# Patient Record
Sex: FEMALE | Race: WHITE | Employment: OTHER | URBAN - METROPOLITAN AREA
[De-identification: names, ages, dates, MRNs, and addresses within clinical notes are randomized per-mention and may not be internally consistent; named-entity substitution may affect disease eponyms.]

---

## 2020-07-07 ENCOUNTER — APPOINTMENT (OUTPATIENT)
Dept: NUCLEAR MEDICINE | Age: 85
End: 2020-07-07
Attending: EMERGENCY MEDICINE
Payer: MEDICARE

## 2020-07-07 ENCOUNTER — APPOINTMENT (OUTPATIENT)
Dept: CT IMAGING | Age: 85
End: 2020-07-07
Attending: EMERGENCY MEDICINE
Payer: MEDICARE

## 2020-07-07 ENCOUNTER — HOSPITAL ENCOUNTER (EMERGENCY)
Age: 85
Discharge: HOME | End: 2020-07-07
Attending: EMERGENCY MEDICINE | Admitting: EMERGENCY MEDICINE
Payer: MEDICARE

## 2020-07-07 ENCOUNTER — APPOINTMENT (OUTPATIENT)
Dept: GENERAL RADIOLOGY | Age: 85
End: 2020-07-07
Attending: EMERGENCY MEDICINE
Payer: MEDICARE

## 2020-07-07 VITALS
RESPIRATION RATE: 16 BRPM | HEART RATE: 63 BPM | SYSTOLIC BLOOD PRESSURE: 159 MMHG | TEMPERATURE: 98.5 F | OXYGEN SATURATION: 99 % | DIASTOLIC BLOOD PRESSURE: 53 MMHG

## 2020-07-07 DIAGNOSIS — R06.02 SOB (SHORTNESS OF BREATH): Primary | ICD-10-CM

## 2020-07-07 LAB
ALBUMIN SERPL-MCNC: 3.6 G/DL (ref 3.5–5)
ALBUMIN/GLOB SERPL: 0.9 {RATIO} (ref 1.1–2.2)
ALP SERPL-CCNC: 110 U/L (ref 45–117)
ALT SERPL-CCNC: 25 U/L (ref 12–78)
ANION GAP SERPL CALC-SCNC: 6 MMOL/L (ref 5–15)
AST SERPL-CCNC: 25 U/L (ref 15–37)
BASOPHILS # BLD: 0.1 K/UL (ref 0–0.1)
BASOPHILS NFR BLD: 0 % (ref 0–1)
BILIRUB SERPL-MCNC: 0.3 MG/DL (ref 0.2–1)
BNP SERPL-MCNC: 228 PG/ML
BUN SERPL-MCNC: 39 MG/DL (ref 6–20)
BUN/CREAT SERPL: 28 (ref 12–20)
CALCIUM SERPL-MCNC: 9.5 MG/DL (ref 8.5–10.1)
CHLORIDE SERPL-SCNC: 107 MMOL/L (ref 97–108)
CO2 SERPL-SCNC: 26 MMOL/L (ref 21–32)
COMMENT, HOLDF: NORMAL
CREAT SERPL-MCNC: 1.4 MG/DL (ref 0.55–1.02)
D DIMER PPP FEU-MCNC: 2.18 MG/L FEU (ref 0–0.65)
DIFFERENTIAL METHOD BLD: ABNORMAL
EOSINOPHIL # BLD: 0.3 K/UL (ref 0–0.4)
EOSINOPHIL NFR BLD: 2 % (ref 0–7)
ERYTHROCYTE [DISTWIDTH] IN BLOOD BY AUTOMATED COUNT: 15.2 % (ref 11.5–14.5)
GLOBULIN SER CALC-MCNC: 3.9 G/DL (ref 2–4)
GLUCOSE SERPL-MCNC: 194 MG/DL (ref 65–100)
HCT VFR BLD AUTO: 40.4 % (ref 35–47)
HGB BLD-MCNC: 12.7 G/DL (ref 11.5–16)
IMM GRANULOCYTES # BLD AUTO: 0.1 K/UL (ref 0–0.04)
IMM GRANULOCYTES NFR BLD AUTO: 1 % (ref 0–0.5)
LYMPHOCYTES # BLD: 1.7 K/UL (ref 0.8–3.5)
LYMPHOCYTES NFR BLD: 14 % (ref 12–49)
MAGNESIUM SERPL-MCNC: 2.4 MG/DL (ref 1.6–2.4)
MCH RBC QN AUTO: 29.9 PG (ref 26–34)
MCHC RBC AUTO-ENTMCNC: 31.4 G/DL (ref 30–36.5)
MCV RBC AUTO: 95.1 FL (ref 80–99)
MONOCYTES # BLD: 0.7 K/UL (ref 0–1)
MONOCYTES NFR BLD: 6 % (ref 5–13)
NEUTS SEG # BLD: 9.1 K/UL (ref 1.8–8)
NEUTS SEG NFR BLD: 77 % (ref 32–75)
NRBC # BLD: 0 K/UL (ref 0–0.01)
NRBC BLD-RTO: 0 PER 100 WBC
PLATELET # BLD AUTO: 207 K/UL (ref 150–400)
PMV BLD AUTO: 11 FL (ref 8.9–12.9)
POTASSIUM SERPL-SCNC: 4.4 MMOL/L (ref 3.5–5.1)
PROT SERPL-MCNC: 7.5 G/DL (ref 6.4–8.2)
RBC # BLD AUTO: 4.25 M/UL (ref 3.8–5.2)
SAMPLES BEING HELD,HOLD: NORMAL
SODIUM SERPL-SCNC: 139 MMOL/L (ref 136–145)
TROPONIN I SERPL-MCNC: <0.05 NG/ML
WBC # BLD AUTO: 11.9 K/UL (ref 3.6–11)

## 2020-07-07 PROCEDURE — A9558 XE133 XENON 10MCI: HCPCS

## 2020-07-07 PROCEDURE — 83880 ASSAY OF NATRIURETIC PEPTIDE: CPT

## 2020-07-07 PROCEDURE — 93005 ELECTROCARDIOGRAM TRACING: CPT

## 2020-07-07 PROCEDURE — 80053 COMPREHEN METABOLIC PANEL: CPT

## 2020-07-07 PROCEDURE — 70450 CT HEAD/BRAIN W/O DYE: CPT

## 2020-07-07 PROCEDURE — 84484 ASSAY OF TROPONIN QUANT: CPT

## 2020-07-07 PROCEDURE — 71046 X-RAY EXAM CHEST 2 VIEWS: CPT

## 2020-07-07 PROCEDURE — 85379 FIBRIN DEGRADATION QUANT: CPT

## 2020-07-07 PROCEDURE — 85025 COMPLETE CBC W/AUTO DIFF WBC: CPT

## 2020-07-07 PROCEDURE — 99285 EMERGENCY DEPT VISIT HI MDM: CPT

## 2020-07-07 PROCEDURE — 83735 ASSAY OF MAGNESIUM: CPT

## 2020-07-07 NOTE — ED PROVIDER NOTES
HPI     Pt is a 80 y.o. F with PMH of thyroid dz here with c/o shortness of breath that started today. 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 she says she was at rest when dypsnea started. She feels better now. However, during that time she felt so short of breath she couldn't speak and was concerned she was having a stroke. No fever. No sick contacts. She has had cough. No chest pain. No leg pain or swelling. No other complaints at this time.       Past Medical History:   Diagnosis Date    Arthritis     Hypertension     Hypothyroid        Past Surgical History:   Procedure Laterality Date    HX CATARACT REMOVAL      bilat    HX ORTHOPAEDIC      left shoulder arthoscopy         Family History:   Problem Relation Age of Onset    Cancer Father     Cancer Sister        Social History     Socioeconomic History    Marital status:      Spouse name: Not on file    Number of children: Not on file    Years of education: Not on file    Highest education level: Not on file   Occupational History    Not on file   Social Needs    Financial resource strain: Not on file    Food insecurity     Worry: Not on file     Inability: Not on file    Transportation needs     Medical: Not on file     Non-medical: Not on file   Tobacco Use    Smoking status: Never Smoker    Smokeless tobacco: Never Used   Substance and Sexual Activity    Alcohol use: Yes     Comment: social    Drug use: No    Sexual activity: Not on file   Lifestyle    Physical activity     Days per week: Not on file     Minutes per session: Not on file    Stress: Not on file   Relationships    Social connections     Talks on phone: Not on file     Gets together: Not on file     Attends Christianity service: Not on file     Active member of club or organization: Not on file     Attends meetings of clubs or organizations: Not on file     Relationship status: Not on file    Intimate partner violence     Fear of current or ex partner: Not on file     Emotionally abused: Not on file     Physically abused: Not on file     Forced sexual activity: Not on file   Other Topics Concern    Not on file   Social History Narrative    Not on file         ALLERGIES: Patient has no known allergies. Review of Systems   Constitutional: Negative for chills, diaphoresis and fever. HENT: Negative for congestion and trouble swallowing. Eyes: Negative for photophobia and visual disturbance. Respiratory: Positive for shortness of breath. Negative for cough and chest tightness. Cardiovascular: Negative for chest pain, palpitations and leg swelling. Gastrointestinal: Negative for abdominal pain, diarrhea, nausea and vomiting. Genitourinary: Negative for difficulty urinating, dysuria, flank pain and frequency. Musculoskeletal: Negative for back pain and myalgias. Skin: Negative for rash and wound. Neurological: Negative for dizziness, weakness, light-headedness and headaches. Hematological: Negative for adenopathy. Does not bruise/bleed easily. Psychiatric/Behavioral: Negative for agitation and confusion. All other systems reviewed and are negative. Vitals:    07/07/20 1610   BP: 141/72   Pulse: 75   Resp: 20   Temp: 97.9 °F (36.6 °C)   SpO2: 98%            Physical Exam  Vitals signs and nursing note reviewed. Constitutional:       General: She is not in acute distress. Appearance: She is well-developed. She is not diaphoretic. HENT:      Head: Normocephalic. Eyes:      Conjunctiva/sclera: Conjunctivae normal.      Pupils: Pupils are equal, round, and reactive to light. Neck:      Musculoskeletal: Normal range of motion and neck supple. Vascular: No JVD. Cardiovascular:      Rate and Rhythm: Normal rate and regular rhythm. Heart sounds: Normal heart sounds.    Pulmonary:      Effort: Pulmonary effort is normal.      Breath sounds: Rhonchi (bilateral upper lobes with experiation) present. Abdominal:      General: Bowel sounds are normal. There is no distension. Palpations: Abdomen is soft. Tenderness: There is no abdominal tenderness. Musculoskeletal: Normal range of motion. General: No tenderness or deformity. Lymphadenopathy:      Cervical: No cervical adenopathy. Skin:     General: Skin is warm and dry. Capillary Refill: Capillary refill takes less than 2 seconds. Findings: No erythema or rash. Comments: Scar right neck (old and healed)   Neurological:      Mental Status: She is alert and oriented to person, place, and time. Cranial Nerves: No cranial nerve deficit. Sensory: No sensory deficit. MDM       Procedures    ED EKG interpretation:  Rhythm: normal sinus rhythm; and regular . Rate (approx.): 73; Axis: right; P wave: normal; QRS interval: normal ; ST/T wave: normal; some artifact noted. EKG documented by Brooke Meza MD, as interpreted by Myla Ni MD, ED MD.    7:58 PM  Pt still denies complaints in ED including no dyspnea. She is waiting on V/Q scan 2/2 to shortness of breath episode earlier, right axis on EKG and elevated D-dimer with renal fxn not allowing for CTA chest to r/o PE.  CXR reviewed and normal. 2/2 to pt being 89 with episode of shortness of breath, she may benefit from COVID test prior to discharge, if able to be discharged home. 7:59 PM  Change of shift. Care of patient signed over to Dr. Angelina Causey. Bedside handoff complete. Awaiting V/Q scan . Brooke Meza MD      4362: Results of VQ scan reviewed as well as other labs and imaging. On my interview of the patient she endorses an episode of difficulty speaking which she associates with the feeling of difficulty catching her breath. During that time she also had difficulty using the telephone because of abnormal coordination. After discussion with the patient plan to add a CT head.     Head CT reassuring, discussed with patient. Discharged with instructions to follow with primary care and return for problems.

## 2020-07-07 NOTE — ED TRIAGE NOTES
Pt arrives with personal cane, in wheelchair through triage c/o sudden onset SOB this afternoon.  Denies CP, reports this has not happened before

## 2020-07-08 ENCOUNTER — PATIENT OUTREACH (OUTPATIENT)
Dept: CASE MANAGEMENT | Age: 85
End: 2020-07-08

## 2020-07-08 LAB
ATRIAL RATE: 73 BPM
CALCULATED P AXIS, ECG09: 64 DEGREES
CALCULATED R AXIS, ECG10: -23 DEGREES
CALCULATED T AXIS, ECG11: 55 DEGREES
DIAGNOSIS, 93000: NORMAL
P-R INTERVAL, ECG05: 140 MS
Q-T INTERVAL, ECG07: 388 MS
QRS DURATION, ECG06: 74 MS
QTC CALCULATION (BEZET), ECG08: 427 MS
VENTRICULAR RATE, ECG03: 73 BPM

## 2020-07-08 NOTE — ED NOTES
Assisted pt with dialing son's phone number for son to pick her up.  He will pick her up in 15-20 min

## 2020-07-08 NOTE — DISCHARGE INSTRUCTIONS

## 2020-07-08 NOTE — ED NOTES
Discharge instructions given to pt by RN in teach back method and verbalizes understanding. Opportunity for questions provided.  Pt ambulatory out of unit, in no acute distress and taken home by son

## 2020-07-08 NOTE — PROGRESS NOTES
ED 2020:  SOB  Patient contacted regarding recent discharge and COVID-19 risk. Discussed COVID-19 related testing which was not done at this time. Test results were not done. Patient informed of results, if available? no    Care Transition Nurse/ Ambulatory Care Manager contacted the family by telephone to perform post discharge assessment. Verified name and  with family as identifiers. Patient has following risk factors of: no known risk factors. CTN/ACM reviewed discharge instructions, medical action plan and red flags related to discharge diagnosis. Reviewed and educated them on any new and changed medications related to discharge diagnosis. Caretaker son reports patient c/o difficulty talking, attempting to call son Delfina Prasad, patient was using the [employer] vs the cell phone-thinks patient had a anxiety attack not being able to call him. Education provided regarding infection prevention, and signs and symptoms of COVID-19 and when to seek medical attention with family/caretaker of patient who verbalized understanding. Discussed exposure protocols and quarantine from [address] you at higher risk for severe illness  and given an opportunity for questions and concerns. The family agrees to contact the COVID-19 hotline 768-768-0006 or PCP office for questions related to their healthcare. CTN/ACM provided contact information for future reference. From CDC: Are you at higher risk for severe illness?  Wash your hands often.  Avoid close contact (6 feet, which is about two arm lengths) with people who are sick.  Put distance between yourself and other people if COVID-19 is spreading in your community.  Clean and disinfect frequently touched surfaces.  Avoid all cruise travel and non-essential air travel.  Call your healthcare professional if you have concerns about COVID-19 and your underlying condition or if you are sick.     For more information on steps you can take to protect yourself, see CDC's How to Protect Yourself      Patient/family/caregiver given information for [employer] [facility] and agrees to enroll no    Plan for follow-up call in 7-14 days based on severity of symptoms and risk factors.

## 2022-01-24 ENCOUNTER — APPOINTMENT (OUTPATIENT)
Dept: CT IMAGING | Age: 87
End: 2022-01-24
Attending: STUDENT IN AN ORGANIZED HEALTH CARE EDUCATION/TRAINING PROGRAM
Payer: MEDICARE

## 2022-01-24 ENCOUNTER — HOSPITAL ENCOUNTER (EMERGENCY)
Age: 87
Discharge: NURSING, REHAB OR LONG-TERM CARE | End: 2022-01-25
Attending: STUDENT IN AN ORGANIZED HEALTH CARE EDUCATION/TRAINING PROGRAM | Admitting: STUDENT IN AN ORGANIZED HEALTH CARE EDUCATION/TRAINING PROGRAM
Payer: MEDICARE

## 2022-01-24 DIAGNOSIS — S32.10XA CLOSED FRACTURE OF SACRUM, UNSPECIFIED PORTION OF SACRUM, INITIAL ENCOUNTER (HCC): ICD-10-CM

## 2022-01-24 DIAGNOSIS — R29.6 FALL IN ELDERLY PATIENT: Primary | ICD-10-CM

## 2022-01-24 LAB
ALBUMIN SERPL-MCNC: 3.5 G/DL (ref 3.5–5)
ALBUMIN/GLOB SERPL: 0.9 {RATIO} (ref 1.1–2.2)
ALP SERPL-CCNC: 86 U/L (ref 45–117)
ALT SERPL-CCNC: 18 U/L (ref 12–78)
ANION GAP SERPL CALC-SCNC: 5 MMOL/L (ref 5–15)
AST SERPL-CCNC: 25 U/L (ref 15–37)
ATRIAL RATE: 75 BPM
BASOPHILS # BLD: 0 K/UL (ref 0–0.1)
BASOPHILS NFR BLD: 1 % (ref 0–1)
BILIRUB SERPL-MCNC: 0.5 MG/DL (ref 0.2–1)
BUN SERPL-MCNC: 19 MG/DL (ref 6–20)
BUN/CREAT SERPL: 20 (ref 12–20)
CALCIUM SERPL-MCNC: 9.8 MG/DL (ref 8.5–10.1)
CALCULATED P AXIS, ECG09: 25 DEGREES
CALCULATED R AXIS, ECG10: -36 DEGREES
CALCULATED T AXIS, ECG11: 42 DEGREES
CHLORIDE SERPL-SCNC: 106 MMOL/L (ref 97–108)
CK SERPL-CCNC: 78 U/L (ref 26–192)
CO2 SERPL-SCNC: 28 MMOL/L (ref 21–32)
COMMENT, HOLDF: NORMAL
COVID-19 RAPID TEST, COVR: NOT DETECTED
CREAT SERPL-MCNC: 0.95 MG/DL (ref 0.55–1.02)
DIAGNOSIS, 93000: NORMAL
DIFFERENTIAL METHOD BLD: NORMAL
EOSINOPHIL # BLD: 0.4 K/UL (ref 0–0.4)
EOSINOPHIL NFR BLD: 6 % (ref 0–7)
ERYTHROCYTE [DISTWIDTH] IN BLOOD BY AUTOMATED COUNT: 13 % (ref 11.5–14.5)
GLOBULIN SER CALC-MCNC: 3.7 G/DL (ref 2–4)
GLUCOSE SERPL-MCNC: 98 MG/DL (ref 65–100)
HCT VFR BLD AUTO: 36.8 % (ref 35–47)
HGB BLD-MCNC: 11.5 G/DL (ref 11.5–16)
IMM GRANULOCYTES # BLD AUTO: 0 K/UL (ref 0–0.04)
IMM GRANULOCYTES NFR BLD AUTO: 0 % (ref 0–0.5)
LYMPHOCYTES # BLD: 1.3 K/UL (ref 0.8–3.5)
LYMPHOCYTES NFR BLD: 18 % (ref 12–49)
MCH RBC QN AUTO: 30.3 PG (ref 26–34)
MCHC RBC AUTO-ENTMCNC: 31.3 G/DL (ref 30–36.5)
MCV RBC AUTO: 96.8 FL (ref 80–99)
MONOCYTES # BLD: 0.6 K/UL (ref 0–1)
MONOCYTES NFR BLD: 8 % (ref 5–13)
NEUTS SEG # BLD: 5 K/UL (ref 1.8–8)
NEUTS SEG NFR BLD: 67 % (ref 32–75)
NRBC # BLD: 0 K/UL (ref 0–0.01)
NRBC BLD-RTO: 0 PER 100 WBC
P-R INTERVAL, ECG05: 156 MS
PLATELET # BLD AUTO: 160 K/UL (ref 150–400)
PMV BLD AUTO: 10.6 FL (ref 8.9–12.9)
POTASSIUM SERPL-SCNC: 4.1 MMOL/L (ref 3.5–5.1)
PROT SERPL-MCNC: 7.2 G/DL (ref 6.4–8.2)
Q-T INTERVAL, ECG07: 392 MS
QRS DURATION, ECG06: 76 MS
QTC CALCULATION (BEZET), ECG08: 437 MS
RBC # BLD AUTO: 3.8 M/UL (ref 3.8–5.2)
SAMPLES BEING HELD,HOLD: NORMAL
SODIUM SERPL-SCNC: 139 MMOL/L (ref 136–145)
SOURCE, COVRS: NORMAL
TROPONIN-HIGH SENSITIVITY: 23 NG/L (ref 0–51)
VENTRICULAR RATE, ECG03: 75 BPM
WBC # BLD AUTO: 7.4 K/UL (ref 3.6–11)

## 2022-01-24 PROCEDURE — 85025 COMPLETE CBC W/AUTO DIFF WBC: CPT

## 2022-01-24 PROCEDURE — 72131 CT LUMBAR SPINE W/O DYE: CPT

## 2022-01-24 PROCEDURE — 87635 SARS-COV-2 COVID-19 AMP PRB: CPT

## 2022-01-24 PROCEDURE — 99285 EMERGENCY DEPT VISIT HI MDM: CPT

## 2022-01-24 PROCEDURE — 74011250637 HC RX REV CODE- 250/637: Performed by: STUDENT IN AN ORGANIZED HEALTH CARE EDUCATION/TRAINING PROGRAM

## 2022-01-24 PROCEDURE — 82550 ASSAY OF CK (CPK): CPT

## 2022-01-24 PROCEDURE — 74011250636 HC RX REV CODE- 250/636: Performed by: STUDENT IN AN ORGANIZED HEALTH CARE EDUCATION/TRAINING PROGRAM

## 2022-01-24 PROCEDURE — 96374 THER/PROPH/DIAG INJ IV PUSH: CPT

## 2022-01-24 PROCEDURE — 72192 CT PELVIS W/O DYE: CPT

## 2022-01-24 PROCEDURE — 36415 COLL VENOUS BLD VENIPUNCTURE: CPT

## 2022-01-24 PROCEDURE — 72125 CT NECK SPINE W/O DYE: CPT

## 2022-01-24 PROCEDURE — 93005 ELECTROCARDIOGRAM TRACING: CPT

## 2022-01-24 PROCEDURE — 80053 COMPREHEN METABOLIC PANEL: CPT

## 2022-01-24 PROCEDURE — 70450 CT HEAD/BRAIN W/O DYE: CPT

## 2022-01-24 PROCEDURE — 84484 ASSAY OF TROPONIN QUANT: CPT

## 2022-01-24 RX ORDER — HYDROCODONE BITARTRATE AND ACETAMINOPHEN 5; 325 MG/1; MG/1
1 TABLET ORAL ONCE
Status: COMPLETED | OUTPATIENT
Start: 2022-01-24 | End: 2022-01-24

## 2022-01-24 RX ORDER — ASPIRIN 81 MG/1
81 TABLET ORAL 2 TIMES DAILY
Qty: 60 TABLET | Refills: 0 | Status: SHIPPED | OUTPATIENT
Start: 2022-01-24

## 2022-01-24 RX ORDER — HYDROCODONE BITARTRATE AND ACETAMINOPHEN 5; 325 MG/1; MG/1
1 TABLET ORAL
Qty: 15 TABLET | Refills: 0 | Status: SHIPPED | OUTPATIENT
Start: 2022-01-24 | End: 2022-01-24 | Stop reason: HOSPADM

## 2022-01-24 RX ORDER — ASPIRIN 81 MG/1
81 TABLET ORAL 2 TIMES DAILY
Qty: 60 TABLET | Refills: 0 | Status: SHIPPED | OUTPATIENT
Start: 2022-01-24 | End: 2022-01-24 | Stop reason: SDUPTHER

## 2022-01-24 RX ORDER — KETOROLAC TROMETHAMINE 30 MG/ML
15 INJECTION, SOLUTION INTRAMUSCULAR; INTRAVENOUS
Status: COMPLETED | OUTPATIENT
Start: 2022-01-24 | End: 2022-01-24

## 2022-01-24 RX ADMIN — KETOROLAC TROMETHAMINE 15 MG: 30 INJECTION, SOLUTION INTRAMUSCULAR; INTRAVENOUS at 11:57

## 2022-01-24 RX ADMIN — HYDROCODONE BITARTRATE AND ACETAMINOPHEN 1 TABLET: 5; 325 TABLET ORAL at 14:05

## 2022-01-24 NOTE — DISCHARGE INSTRUCTIONS
Thank you for allowing us to provide you with medical care today. We realize that you have many choices for your emergency care needs. We thank you for choosing [facility].  Please choose us in the future for any continued health care needs. We hope we addressed all of your medical concerns. We strive to provide excellent quality care in the Emergency Department. Anything less than excellent does not meet our expectations. The exam and treatment you received in the Emergency Department were for an emergent problem and are not intended as complete care. It is important that you follow up with a doctor, nurse practitioner, or  [id number] assistant for ongoing care. If your symptoms worsen or you do not improve as expected and you are unable to reach your usual health care provider, you should return to the Emergency Department. We are available 24 hours a day. Take this sheet with you when you go to your follow-up visit. If you have any problem arranging the follow-up visit, contact the Emergency Department immediately. Make an appointment your family doctor for follow up of this visit. Return to the ER if you are unable to be seen in a timely manner.

## 2022-01-24 NOTE — PROGRESS NOTES
1/24/2022 -     Date of previous inpatient admission/ ED visit? 7/7/2020 for Difficulty Breathing      What brought the patient back to ED? Fall with Vertical Sacral Fracture     Did patient decline recommended services during last admission/ ED visit (if yes, what)? Has patient seen a provider since their last inpatient admission/ED visit (if yes, when)? PCP: First and Last name: Dr. Jackelyn Villarreal   Name of Practice:    Are you a current patient: Yes/No:  Yes   Approximate date of last visit: October; next in April     CM Interventions:  From previous inpatient admission/ED visit:  From current inpatient admission/ED visit  CM notes SS CM Consult for patient living alone with recent fall, to include a vertical sacral fracture. CM met with patient, with patient alert and oriented x4. Patient is very hard of hearing and deferred assessment process to her son, present at bedside. At baseline, patient lives alone in a single story home, 4 exterior steps. At baseline, patient is independent in ADLs, to include driving and living alone. Home DME includes: cane, raised toilet seat, scale, 2x hearing aids     Patient has distant hx of HH and no hx Rehab    Pharmacy preference is: [facility] at [place]    Additional support includes: 2 adult children, whom both live locally    Patient has a Medical Directive on file that does not indicate mPOA. Per son, patient has a durable POA. Son, Zoey Thomas, is indicated as POA in the durable POA. Patient has been fully vaccinated via the [address] vaccine, with the booster dose having been received on 1/5. CM discussed IPR vs SNF Rehab. Patient's son's preference is for IPR at [facility]. CM identified that patient will likely need to work with PT and OT. Hospitalist Consult pending.   Patient's family and patient are aware that patient may not be appropriate for a hospital admission and may be held in the ED for PT/OT evals in the morning of 1/25. CM submitted referral to [facility] via [employer]. Patient cleared for diet, and nursing to switch patient to a hospital bed. CM Team to continue following for any additional EUGENIA needs. Transition of Care Plan:     The Plan for Transition of Care is related to the following treatment goals: IPR at [facility]    The Patient and/or patient representative son, Ricky Churchill, was provided with a choice of provider and agrees  with the discharge plan. Yes [x] No []    A Freedom of choice list was provided with basic dialogue that supports the patient's individualized plan of care/goals and shares the quality data associated with the providers. Yes [x] No []      CRM: Marjorie Lancaster, MPH, [facility]; Z: [phone]    18:00 -    contacted [facility]: [phone] who identified that the facility does not have bed availability tonight, 1/24. Facility is anticipated to be able to accept patient tomorrow, 1/25, and liaison will reach out to [place] Team in the morning of 1/25. Patient likely to be held in the ED overnight to work with therapies in the morning, 1/25, with anticipated admission to [facility] 1/25.   CRM: Marjorie Lancaster, MPH, [address] [place] [address]; Z: [phone]

## 2022-01-24 NOTE — PROGRESS NOTES
ED physician updated hospitalist team about likely discharge and no need of admission. Met with . IPR request initiated. At present, patient does not need admission. Hospitalist consult canceled for now. Please reconsult if needed for any other reasons.

## 2022-01-24 NOTE — ED PROVIDER NOTES
69-year-old woman with history of arthritis, hypertension, hypothyroidism who lives independently and drives and performs her ADLs on her own with pain in multiple sites after a fall. States that she was walking and slipped and fell backwards onto her bottom. She is complaining of pain in lower back and neck initially. Denies numbness, tingling, focal weakness or other neurological complaints. She did not attempt to ambulate prior to transport here. Past Medical History:   Diagnosis Date    Arthritis     Hypertension     Hypothyroid        Past Surgical History:   Procedure Laterality Date    HX CATARACT REMOVAL      bilat    HX ORTHOPAEDIC      left shoulder arthoscopy         Family History:   Problem Relation Age of Onset    Cancer Father     Cancer Sister        Social History     Socioeconomic History    Marital status:      Spouse name: Not on file    Number of children: Not on file    Years of education: Not on file    Highest education level: Not on file   Occupational History    Not on file   Tobacco Use    Smoking status: Never Smoker    Smokeless tobacco: Never Used   Substance and Sexual Activity    Alcohol use: Yes     Comment: social    Drug use: No    Sexual activity: Not on file   Other Topics Concern    Not on file   Social History Narrative    Not on file     Social Determinants of Health     Financial Resource Strain:     Difficulty of Paying Living Expenses: Not on file   Food Insecurity:     Worried About [address] in the Last Year: Not on file    Sarika of Food in the Last Year: Not on file   Transportation Needs:     Lack of Transportation (Medical): Not on file    Lack of Transportation (Non-Medical):  Not on file   Physical Activity:     Days of Exercise per Week: Not on file    Minutes of Exercise per Session: Not on file   Stress:     Feeling of Stress : Not on file   Social Connections:     Frequency of Communication with Friends and Family: Not on file    Frequency of Social Gatherings with Friends and Family: Not on file    Attends Cheondoism Services: Not on file    Active Member of Clubs or Organizations: Not on file    Attends Club or Organization Meetings: Not on file    Marital Status: Not on file   Intimate Partner Violence:     Fear of Current or Ex-Partner: Not on file    Emotionally Abused: Not on file    Physically Abused: Not on file    Sexually Abused: Not on file   Housing Stability:     Unable to Pay for Housing in the Last Year: Not on file    Number of [place] in the Last Year: Not on file    Unstable Housing in the Last Year: Not on file         ALLERGIES: Patient has no known allergies. Review of Systems   Constitutional: Positive for chills. Negative for fever. Eyes: Negative for photophobia. Respiratory: Negative for cough and shortness of breath. Cardiovascular: Positive for chest pain. Gastrointestinal: Negative for abdominal pain, nausea and vomiting. Genitourinary: Negative for dysuria and flank pain. Musculoskeletal: Negative for back pain. Skin: Negative for pallor. Neurological: Negative for light-headedness, numbness and headaches. Psychiatric/Behavioral: Negative for confusion. All other systems reviewed and are negative. Vitals:    01/24/22 0911 01/24/22 1100   BP: (!) 210/87 (!) 191/85   Pulse: 82    Resp: 20    Temp: 97 °F (36.1 °C)    SpO2: 98% 98%            Physical Exam  Vitals and nursing note reviewed. Constitutional:       General: She is not in acute distress. Appearance: She is well-developed. HENT:      Head: Normocephalic and atraumatic. Mouth/Throat:      Pharynx: No oropharyngeal exudate. Eyes:      Pupils: Pupils are equal, round, and reactive to light. Cardiovascular:      Rate and Rhythm: Normal rate. Pulmonary:      Effort: Pulmonary effort is normal.   Chest:      Chest wall: No tenderness.    Abdominal:      General: There is no distension. Palpations: Abdomen is soft. Tenderness: There is no abdominal tenderness. There is no right CVA tenderness or left CVA tenderness. Musculoskeletal:         General: No deformity. Thoracic back: No signs of trauma. Normal range of motion. Lumbar back: Bony tenderness present. No signs of trauma. Normal range of motion. Negative right straight leg raise test and negative left straight leg raise test.        Back:       Comments: Entire spine palpated, no tenderness or deformity. Skin:     General: Skin is warm and dry. Capillary Refill: Capillary refill takes less than 2 seconds. Neurological:      General: No focal deficit present. Mental Status: She is alert and oriented to person, place, and time. Cranial Nerves: Cranial nerves are intact. No cranial nerve deficit. Sensory: No sensory deficit. Motor: No weakness or pronator drift. Coordination: Coordination is intact. Psychiatric:         Behavior: Behavior normal.          MDM            No low back pain after a fall. Initially x-rays negative but patient had groin pain and a CT of the pelvis was obtained, possible vertically oriented sacral fracture. Orthopedics consulted 3:43 PM and evaluated. Awaiting attending review of films. Likely discharge if possible.     Procedures

## 2022-01-24 NOTE — CONSULTS
Orthopedic Consult Note    Date of Consultation:  January 24, 2022  Referring Physician:  Wily Hunt MD  CC: R hip pain    S: Pallavi tootie is a 80 y.o. female who c/o Right hip pain, after fall at home; Pain in the Right groin, buttocks bilaterally, moderate, worse with movement and attempting to stand; Denies focal weakness, numbness, tingling, fever, chills, abd pain, incontinence, cp, sob. Has not ambulated or urinated since event. O: Lying in bed comfortably, no resp distress; TTP lateral R hip, NTTP L hip, midline L spine, sacrum ; Pelvis stable with gentle rotational force; BLE normal alignment without obvious deformity or limb length discrepancy; skin anterior and lateral pelvic region wnl; tolerating PROM of the R and L hip; EHL/DF/PF intact, SILT in distribution of sural, superficial and deep peroneal n.; DP 2+, foot warm, cap refill <2secs.  and rectal exam deferred. Patient Vitals for the past 4 hrs:   Pulse BP   01/24/22 1522 81 139/68   01/24/22 1510 65    01/24/22 1455 67 (!) 200/98     Recent Labs     01/24/22  0931   HGB 11.5   HCT 36.8      BUN 19   CREA 0.95   K 4.1          CT PELV WO CONT  Narrative: EXAM: CT PELV WO CONT    INDICATION: r hip pain and tenderness after a fall    COMPARISON: None. CONTRAST: None. TECHNIQUE:   Multislice helical CT was performed from the iliac crest to the symphysis pubis  without intravenous contrast administration Oral contrast was/was not  administered. Coronal and sagittal reformations were generated. CT dose  reduction was achieved through use of a standardized protocol tailored for this  examination and automatic exposure control for dose modulation. FINDING:  The visualized bowel within the pelvis is normal.     Multiple sigmoid diverticula are present. . A calcified fibroid is present. There is no pelvic mass or adenopathy. There is no pelvic ascites or fluid  collection.     There is a vertically oriented left sacral fracture without involvement of  sacral ala. Impression: Probable acute vertically oriented left sacral fracture. Sigmoid diverticulosis  CT SPINE LUMB WO CONT  Narrative: INDICATION: back pain after fall    COMPARISON: None    EXAM: Axial CT imaging of the lumbar spine with coronal and sagittal  reformations. CT dose reduction was achieved through use of a standardized  protocol tailored for this examination and automatic exposure control for dose  modulation. FINDINGS: The bones are moderately osteopenic. Vertebral body heights are  normal. There is no substantial listhesis. A mild levoconvex curve is shown  centered at L2. There is diffuse disc space narrowing, appearing moderate at T11-12 and T12-L1,  and moderate at L3-4. The posterior processes and facet joints are intact. Substantial bilateral facet osteoarthrosis is present at L3-4 and L4-5. Vacuum  phenomenon at all disc levels through L4-5 is shown. There is partial ankylosis  of the L5-S1 disc space. Mild bilateral SI joint osteoarthrosis is noted. No  paraspinal soft tissue mass is demonstrated. There are moderately abundant  atherosclerotic calcifications as well as demonstration of bilateral renal  lesions which are probably cysts, measuring up to 4.5 cm. There is mild spinal canal stenosis at L2-3 with mild bilateral foraminal  stenosis. There is moderate to severe canal stenosis at L3-4, with moderate-severe left  and moderate right foraminal stenosis. There is moderate L4-5 canal stenosis with moderate to severe left and moderate  right foraminal stenosis. No canal or foraminal stenosis is shown at L5-S1. Impression: Osteopenia and degenerative spondylosis without demonstration of  fracture or dislocation. CT SPINE CERV WO CONT  Narrative: EXAM:  CT CERVICAL SPINE WITHOUT CONTRAST    INDICATION: neck pain after fall. COMPARISON: None. CONTRAST:  None.     TECHNIQUE: Multislice helical CT of the cervical spine was performed without  intravenous contrast administration. Sagittal and coronal reformats were  generated. CT dose reduction was achieved through use of a standardized  protocol tailored for this examination and automatic exposure control for dose  modulation. FINDINGS:    The bones are moderately to severely osteopenic. Vertebral body heights are  normal. Alignment is anatomic. Posterior processes and facet joints appear  intact. There is mild narrowing of the C3-4 disc space and moderate narrowing of  the C4-5, C5-6 and C6-7 disc spaces. Mild facet osteoarthrosis is shown in the  left at C3-4, C4-5 and C5-6, and on the right at C4-5. No osseous canal stenosis  is shown. There is mild right foraminal stenosis at C4-5 and C5-6. No paraspinal  soft tissue mass or collection is demonstrated. Impression: Osteopenia and spondylosis. No acute fracture or dislocation  demonstrated. CT HEAD WO CONT  Narrative: EXAM: CT HEAD WO CONT    INDICATION: headache, fall    COMPARISON: CT 7/7/2020. CONTRAST: None. TECHNIQUE: Unenhanced CT of the head was performed using 5 mm images. Brain and  bone windows were generated. Coronal and sagittal reformats. CT dose reduction  was achieved through use of a standardized protocol tailored for this  examination and automatic exposure control for dose modulation. FINDINGS:  Moderate prominence of the ventricles and cortical sulci consistent with atrophy  is again shown. Moderately abundant bilateral periventricular diminished  attenuation again noted, consistent with chronic small vessel ischemic disease  the white matter. There is no intracranial hemorrhage, extra-axial collection,  or mass effect. The basilar cisterns are open. No CT evidence of acute infarct. The bone windows demonstrate no abnormalities. The visualized portions of the  paranasal sinuses and mastoid air cells are clear.   Impression: Atrophy and chronic small vessel ischemic disease the white matter again shown. No acute findings. A/P:  No acute surgical needs; Sacral fracture, possibly chronic, no femur fracture. No neurovascular or associated visceral injury suspected. - Nonoperative management, WBAT, Pain control; DVT ppx  - New Pelvic imaging - AP/Inlet/Outlet after ambulation with therapy if not progressing   - If patient is unable to progress with therapy and/or pain is intolerable may need MRI to eval for occult fracture; follow up with Dr. Surya Downing in 2-3 weeks, call their office for appt. - Above history, exam, imaging discussed with Dr. Surya Downing and they agree with plan.     NATE Shrestha  Orthopedic Trauma Service  [facility]

## 2022-01-24 NOTE — ED NOTES
3:18 PM  Change of shift. Care of patient taken over from Dr Dinh Lakhani; H&P reviewed, bedside handoff complete. Awaiting Ortho evaluation/ disposition;     4:19 PM per Ortho 'OK to d/c home/ weight bear as tolerated; Dr Smith Cost follow-up;  Ejnikkie Yves  results have been reviewed with her. She has been counseled regarding her diagnosis. She verbally conveys understanding and agreement of the signs, symptoms, diagnosis, treatment and prognosis and additionally agrees to Call/ Arrange follow up as recommended with Dr. Cinthia Leach MD in 24 - 48 hours. She also agrees with the care-plan and conveys that all of her questions have been answered. I have also put together some discharge instructions for her that include: 1) educational information regarding their diagnosis, 2) how to care for their diagnosis at home, as well a 3) list of reasons why they would want to return to the ED prior to their follow-up appointment, should their condition change or for concerns. 4:22 PM patient's family is bedside and not comfortable taking her home Alba Johnson was alone and will not be able to function at home alone. \"  We will place case management consult. \"    Perfect Serve Consult for Admission  4:52 PM    ED Room Number: ER05/05  Patient Name and age:  Don Plaza 80 y.o.  female  Working Diagnosis:   1. Fall in elderly patient    2. Closed fracture of sacrum, unspecified portion of sacrum, initial encounter ([facility] [address]        COVID-19 Suspicion:  no  Sepsis present:  no  Reassessment needed: yes  Code Status:  Full Code  Readmission: no  Isolation Requirements:  no  Recommended Level of Care:  med/surg  Department:[facility] Adult ED - 21   Other:  Fall/ sacral fracture/ needs PT/OT evaluation/ pain control/ lives alone;     4:56 PM  Spoke with Valeria Cantu, Care management; 'I will speak with them/ see what we can work out';     5:24 PM  'adam leo a PT/OT consultation/ we need to have them evaluate her.  She needs a diet ordered also please. Im trying to get her into [facility]'; per [place];     11:27 PM  Change of shift. Care of patient signed over to Dr Josi Peres. Bedside handoff complete. Awaiting PT/OT evaluation/ PLacement.

## 2022-01-25 VITALS
OXYGEN SATURATION: 94 % | TEMPERATURE: 97.7 F | WEIGHT: 156.97 LBS | HEART RATE: 86 BPM | BODY MASS INDEX: 29.66 KG/M2 | RESPIRATION RATE: 16 BRPM | DIASTOLIC BLOOD PRESSURE: 66 MMHG | SYSTOLIC BLOOD PRESSURE: 115 MMHG

## 2022-01-25 PROCEDURE — 97535 SELF CARE MNGMENT TRAINING: CPT

## 2022-01-25 PROCEDURE — 97530 THERAPEUTIC ACTIVITIES: CPT

## 2022-01-25 PROCEDURE — 97161 PT EVAL LOW COMPLEX 20 MIN: CPT

## 2022-01-25 PROCEDURE — 97116 GAIT TRAINING THERAPY: CPT

## 2022-01-25 PROCEDURE — 97165 OT EVAL LOW COMPLEX 30 MIN: CPT

## 2022-01-25 RX ORDER — CIPROFLOXACIN 250 MG/1
TABLET, FILM COATED ORAL
COMMUNITY
Start: 2021-12-20 | End: 2022-01-25

## 2022-01-25 RX ORDER — AA/PROT/LYSINE/METHIO/VIT C/B6 50-12.5 MG
1 TABLET ORAL DAILY
COMMUNITY

## 2022-01-25 RX ORDER — NITROFURANTOIN 25; 75 MG/1; MG/1
CAPSULE ORAL
COMMUNITY
Start: 2021-04-22 | End: 2022-01-25

## 2022-01-25 RX ORDER — PHENAZOPYRIDINE HYDROCHLORIDE 100 MG/1
TABLET, FILM COATED ORAL
COMMUNITY
Start: 2021-12-10 | End: 2022-01-25

## 2022-01-25 RX ORDER — PREDNISONE 10 MG/1
TABLET ORAL
COMMUNITY
Start: 2021-06-21 | End: 2022-01-25

## 2022-01-25 RX ORDER — BETAMETHASONE DIPROPIONATE 0.5 MG/G
CREAM TOPICAL
COMMUNITY
Start: 2021-06-21 | End: 2022-01-25

## 2022-01-25 RX ORDER — MELOXICAM 7.5 MG/1
TABLET ORAL
COMMUNITY
Start: 2021-12-14 | End: 2022-01-25

## 2022-01-25 RX ORDER — SULFAMETHOXAZOLE AND TRIMETHOPRIM 800; 160 MG/1; MG/1
TABLET ORAL
COMMUNITY
Start: 2021-04-25 | End: 2022-01-25

## 2022-01-25 RX ORDER — AMOXICILLIN AND CLAVULANATE POTASSIUM 875; 125 MG/1; MG/1
TABLET, FILM COATED ORAL
COMMUNITY
Start: 2021-10-29 | End: 2022-01-25

## 2022-01-25 RX ORDER — DICLOFENAC SODIUM 10 MG/G
1 GEL TOPICAL
COMMUNITY

## 2022-01-25 RX ORDER — MELATONIN
1000 DAILY
COMMUNITY

## 2022-01-25 RX ORDER — TRIAMCINOLONE ACETONIDE 0.25 MG/ML
LOTION TOPICAL
COMMUNITY
Start: 2021-06-21 | End: 2022-01-25

## 2022-01-25 NOTE — PROGRESS NOTES
Orders received, chart reviewed and patient evaluated by occupational therapy. Pending progression with skilled acute occupational therapy, recommend:  Therapy 3 hours per day 5-7 days per week     Recommend with nursing ADLs with supervision/setup, OOB to chair 3x/day and toileting via beside commode with 2 assist and walker. Thank you for completing as able in order to maintain patient strength, endurance and independence. Full evaluation to follow.

## 2022-01-25 NOTE — PROGRESS NOTES
Orders received, chart reviewed and patient evaluated by physical therapy. Pending progression with skilled acute physical therapy, recommend:  Therapy 3 hours per day 5-7 days per week    Recommend with nursing OOB to chair 3x/day and walking daily with Min assist and walker. Thank you for completing as able in order to maintain patient strength, endurance and independence. Full evaluation to follow.

## 2022-01-25 NOTE — ED NOTES
Patient sitting in chair beside the bed talking to physical therapy and case management. Pt. Son at bedside as well. Patient has been accepted to [facility] and they can take her this afternoon. Patient eating breakfast at this time.

## 2022-01-25 NOTE — ED NOTES
Patient discharged with daughter, son will transport patient to [facility] rehab. All transfer paperwork and discharge papers given to daughter.

## 2022-01-25 NOTE — ED NOTES
Patient sitting on bedside talking with family member. Pt denies any needs at this time. Pt awaiting breakfast tray and Physical Therapy.

## 2022-01-25 NOTE — PROGRESS NOTES
Problem: Self Care Deficits Care Plan (Adult)  Goal: *Acute Goals and Plan of Care (Insert Text)  Description: FUNCTIONAL STATUS PRIOR TO ADMISSION: Patient was modified independent using a single point cane for functional mobility. Patient was driving and grocery shopping PTA. HOME SUPPORT: The patient lived alone with family in area to provide assistance. Occupational Therapy Goals  Initiated 1/25/2022  1. Patient will perform grooming sitting with supervision/set-up within 7 day(s). 2.  Patient will perform upper body dressing with supervision/set-up within 7 day(s). 3.  Patient will perform lower body dressing using most appropriate DME with minimal assistance/contact guard assist within 7 day(s). 4.  Patient will perform toilet transfers with supervision/set-up within 7 day(s). 5.  Patient will perform all aspects of toileting with minimal assistance/contact guard assist within 7 day(s). 6.  Patient will participate in upper extremity therapeutic exercise/activities with supervision/set-up for 5 minutes within 7 day(s). 7.  Patient will utilize energy conservation techniques during functional activities with verbal cues within 7 day(s). Outcome: Progressing Towards Goal    OCCUPATIONAL THERAPY EVALUATION  Patient: Anish Eden (68 y.o. female)  Date: 1/25/2022  Primary Diagnosis: No admission diagnoses are documented for this encounter. Precautions:  Fall    ASSESSMENT  Based on the objective data described below, the patient presents with limited ADL performance s/p admission for GLF with resulting L sacral fx per CT imaging. Patient ADLs limited by impaired balance, generalized weakness, decreased functional activity tolerance, and limited lower body access. Patient lives alone at baseline and uses a [facility], was driving and has family in area who assist as needed. Today, patient received on EOB, required up to min A for sit<>stand x3-4 2/2 incontinence when standing.  Patient required max A for toileting for all aspects, min A for upper body dressing and mod A for lower body dressing. Patient left sitting in chair at end of session with RN, son and CM at bedside. Patient below her mod I baseline and would benefit from short IPR stay. Will continue to follow. Current Level of Function Impacting Discharge (ADLs/self-care): up to max A for ADLs    Functional Outcome Measure: The patient scored Total: 30/100 on the Barthel Index outcome measure which is indicative of 70% impairment in basic self-care. Other factors to consider for discharge: lives alone, was mod I at baseline     Patient will benefit from skilled therapy intervention to address the above noted impairments. PLAN :  Recommendations and Planned Interventions: self care training, functional mobility training, therapeutic exercise, balance training, therapeutic activities, endurance activities, patient education, home safety training, and family training/education    Frequency/Duration: Patient will be followed by occupational therapy 5 times a week to address goals. Recommendation for discharge: (in order for the patient to meet his/her long term goals)  Therapy 3 hours per day 5-7 days per week    This discharge recommendation:  Has been made in collaboration with the attending provider and/or case management    IF patient discharges home will need the following DME: shower chair and grab bars       SUBJECTIVE:   Patient stated I feel like a baby.  re: needing to wear a brief 2/2 incontinence    OBJECTIVE DATA SUMMARY:   HISTORY:   Past Medical History:   Diagnosis Date    Arthritis     Hypertension     Hypothyroid      Past Surgical History:   Procedure Laterality Date    HX CATARACT REMOVAL      bilat    HX ORTHOPAEDIC      left shoulder arthoscopy       Expanded or extensive additional review of patient history:     Home Situation  Home Environment: Private residence  # Steps to Enter: 3  Rails to Enter: Yes  One/Two Story Residence: One story  Living Alone: Yes  Support Systems: Child(murtaza)  Current DME Used/Available at Home: Cane, straight,Other (comment) (shoe horn; sock aid)  Tub or Shower Type: Tub/Shower combination    Hand dominance: Right    EXAMINATION OF PERFORMANCE DEFICITS:  Cognitive/Behavioral Status:  Neurologic State: Alert  Orientation Level: Oriented to person;Oriented to situation;Oriented to place  Cognition: Appropriate decision making; Appropriate for age attention/concentration; Follows commands  Perception: Appears intact  Perseveration: No perseveration noted  Safety/Judgement: Awareness of environment    Skin: appears intact - noted large bruise on R elbow    Edema: none noted in BUEs    Hearing: Auditory  Auditory Impairment: Hard of hearing, bilateral    Vision/Perceptual:    Tracking: Able to track stimulus in all quadrants w/o difficulty                 Diplopia: No    Acuity: Impaired near vision; Impaired far vision    Corrective Lenses: Reading glasses    Range of Motion:  In BUEs  AROM: Generally decreased, functional                         Strength: In BUEs  Strength: Generally decreased, functional                Coordination:  Coordination: Within functional limits  Fine Motor Skills-Upper: Left Intact; Right Intact    Gross Motor Skills-Upper: Left Intact; Right Intact    Tone & Sensation:  In BUEs  Tone: Normal  Sensation: Intact                      Balance:  Sitting: Intact; Without support  Standing: Impaired; Without support  Standing - Static: Fair;Constant support  Standing - Dynamic : Fair;Constant support    Functional Mobility and Transfers for ADLs:  Bed Mobility:  Rolling:  (NT - recieved on EOB and left in chair)    Transfers:  Sit to Stand: Moderate assistance;Assist x1;Additional time; Adaptive equipment (improved to min A w/ training and cues to correct technique)  Stand to Sit: Minimum assistance;Assist x1    ADL Assessment:  Feeding: Setup    Oral Facial Hygiene/Grooming: Setup    Bathing: Moderate assistance    Upper Body Dressing: Minimum assistance    Lower Body Dressing: Moderate assistance    Toileting: Maximum assistance    ADL Intervention and task modifications:  Feeding  Drink to Mouth: Set-up    Upper Body Dressing Assistance  Front Opened Shirt: Minimum assistance (robe)  Cues: Physical assistance    Lower Body Dressing Assistance  Protective Undergarmet: Maximum assistance  Slip on Shoes with Back: Minimum assistance (uses LH shoe horn at baseline)  Leg Crossed Method Used: No  Position Performed: Seated edge of bed  Cues: Don;Physical assistance    Toileting  Toileting Assistance: Maximum assistance  Bladder Hygiene: Maximum assistance  Clothing Management: Total assistance (dependent)  Cues: Physical assistance for pants up; Tactile cues provided;Verbal cues provided    Cognitive Retraining  Safety/Judgement: Awareness of environment    Functional Measure:    Barthel Index:  Bathin  Bladder: 0  Bowels: 5  Groomin  Dressin  Feedin  Mobility: 5  Stairs: 0  Toilet Use: 5  Transfer (Bed to Chair and Back): 10  Total: 30/100      The Barthel ADL Index: Guidelines  1. The index should be used as a record of what a patient does, not as a record of what a patient could do. 2. The main aim is to establish degree of independence from any help, physical or verbal, however minor and for whatever reason. 3. The need for supervision renders the patient not independent. 4. A patient's performance should be established using the best available evidence. Asking the patient, friends/relatives and nurses are the usual sources, but direct observation and common sense are also important. However direct testing is not needed. 5. Usually the patient's performance over the preceding 24-48 hours is important, but occasionally longer periods will be relevant. 6. Middle categories imply that the patient supplies over 50 per cent of the effort.   7. Use of aids to be independent is allowed. Score Interpretation (from [address] [facility])    Independent   60-79 Minimally independent   40-59 Partially dependent   20-39 Very dependent   <20 Totally dependent     -Sunshine Browne., Barthel, D.W. (1965). Functional evaluation: the Barthel Index. [address] [place] [employer] ([address] [place] (1997). The Barthel activities of daily living index: self-reporting versus actual performance in the old (> or = 75 years). Journal of [address] [place] [address] 45(7), [address] [facility], J.JOSIASF, Lindadella Savs., [place]rendan [place]. (1999). Measuring the change in disability after inpatient rehabilitation; comparison of the responsiveness of the Barthel Index and Functional [place] Measure. Journal of Neurology, Neurosurgery, and Psychiatry, 66(4), 551-207. LEXY English, ISACC Bolden, & Radhames Abdi MIdalmisA. (2004) Assessment of post-stroke quality of life in cost-effectiveness studies: The usefulness of the Barthel Index and the EuroQoL-5D. Quality of Life Research, 15, 217-36     Occupational Therapy Evaluation Charge Determination   History Examination Decision-Making   LOW Complexity : Brief history review  MEDIUM Complexity : 3-5 performance deficits relating to physical, cognitive , or psychosocial skils that result in activity limitations and / or participation restrictions HIGH Complexity : Patient presents with comorbidities that affect occupational performance.  Signifigant modification of tasks or assistance (eg, physical or verbal) with assessment (s) is necessary to enable patient to complete evaluation       Based on the above components, the patient evaluation is determined to be of the following complexity level: MEDIUM  Pain Rating:  Reporting some pain but bearable, did not quantify    Activity Tolerance:   Good    After treatment patient left in no apparent distress:    Sitting in chair, Call bell within reach, Caregiver / family present, and RN david, DONTRELL bedside    COMMUNICATION/EDUCATION:   The patients plan of care was discussed with: Physical therapist, Registered nurse, and Case management. Home safety education was provided and the patient/caregiver indicated understanding. and Patient/family have participated as able in goal setting and plan of care. This patients plan of care is appropriate for delegation to [facility].     Thank you for this referral.  Leobardo Courtney OT  Time Calculation: 43 mins

## 2022-01-25 NOTE — PROGRESS NOTES
Problem: Mobility Impaired (Adult and Pediatric)  Goal: *Acute Goals and Plan of Care (Insert Text)  Description: FUNCTIONAL STATUS PRIOR TO ADMISSION: Patient was modified independent using a single point cane for functional mobility, drives, shopped. HOME SUPPORT PRIOR TO ADMISSION: The patient alone, did not require assist.    Physical Therapy Goals  Initiated 1/25/2022  1. Patient will move from supine to sit and sit to supine  in bed with modified independence within 5 days. 2.  Patient will perform sit to/from stand with modified independence within 5 days. 3.  Patient will ambulate 150 feet with least restrictive assistive device and modified independence within 5 days. 4.  Patient will ascend/descend 3 stairs with handrail(s) with modified independence within 5 days. Outcome: Not Met     PHYSICAL THERAPY EVALUATION  Patient: Micky Quintero (83 y.o. female)  Date: 1/25/2022  Primary Diagnosis: No admission diagnoses are documented for this encounter. Precautions:  Fall, WBAT    ASSESSMENT  Based on the objective data described below, the patient presents below her functional baseline s/p a fall in her home. Imaging shows acute vertically oriented left sacral fracture. Per ortho, no surgical intervention planned, WBAT. Currently, patient's functional mobility is limited by pain, impaired balance, decreased ROM and activity tolerance. Patient may benefit from acute therapy with transition to [facility] to progress to her PLOF before returning home where she lives alone. Current Level of Function Impacting Discharge (mobility/balance): Min A    Functional Outcome Measure: The patient scored 30/100 on the Barthel outcome measure. Other factors to consider for discharge: lives alone, below baseline     Patient will benefit from skilled therapy intervention to address the above noted impairments.        PLAN :  Recommendations and Planned Interventions: bed mobility training, transfer training, gait training, therapeutic exercises, patient and family training/education and therapeutic activities      Frequency/Duration: Patient will be followed by physical therapy:  5 times a week to address goals. Recommendation for discharge: (in order for the patient to meet his/her long term goals)  Therapy 3 hours per day 5-7 days per week    This discharge recommendation:  Has been made in collaboration with the attending provider and/or case management    IF patient discharges home will need the following DME: if home, RW and increased caregiver assistance in the home         SUBJECTIVE:   Patient stated I feel like a baby.   Referring to her incontinence and requiring assistance     OBJECTIVE DATA SUMMARY:   HISTORY:    Past Medical History:   Diagnosis Date    Arthritis     Hypertension     Hypothyroid      Past Surgical History:   Procedure Laterality Date    HX CATARACT REMOVAL      bilat    HX ORTHOPAEDIC      left shoulder arthoscopy       Personal factors and/or comorbidities impacting plan of care: HPI    Home Situation  Home Environment: Private residence  # Steps to Enter: 3  Rails to Enter: Yes  One/Two Story Residence: One story  Living Alone: Yes  Support Systems: Child(murtaza)  Current DME Used/Available at Home: Cane, straight,Other (comment) (shoe horn; sock aid)  Tub or Shower Type: Tub/Shower combination    EXAMINATION/PRESENTATION/DECISION MAKING:   Critical Behavior:  Neurologic State: Alert  Orientation Level: Oriented to person,Oriented to situation,Oriented to place  Cognition: Appropriate decision making,Appropriate for age attention/concentration,Follows commands  Safety/Judgement: Awareness of environment  Hearing:   Auditory  Auditory Impairment: Hard of hearing, bilateral  Skin:  Exposed areas grossly intact  Edema: None noted  Range Of Motion:  AROM: Generally decreased, functional                       Strength:    Strength: Generally decreased, functional Tone & Sensation:   Tone: Normal                              Coordination:  Coordination: Within functional limits  Vision:      Functional Mobility:  Bed Mobility:  N/t* - received/ remained sitting up in the chair           Transfers:  Sit to Stand: Moderate assistance;Assist x1;Additional time; Adaptive equipment (improved to min A w/ training and cues to correct technique)  Stand to Sit: Minimum assistance;Assist x1  Trained in use of walker with sit<->stand. VC required to correct hand placement. Balance:   Sitting: Intact; Without support  Standing: Impaired; Without support  Standing - Static: Fair;Constant support  Standing - Dynamic : Fair;Constant support  Ambulation/Gait Training:  Distance (ft): 50 Feet (ft)  Assistive Device: Gait belt;Walker, rolling  Ambulation - Level of Assistance: Minimal assistance;Assist x1;Additional time; Adaptive equipment     Gait Description (WDL): Exceptions to WDL  Gait Abnormalities: Decreased step clearance        Base of Support: Widened     Speed/[place]: Slow  Step Length: Right shortened;Left shortened  Instructed in walker management with ambulation. Cues to improve body position inside walker frame w/ turns and when approaching the chair to sit. Ther Activity  Pt incontinent. Worked on sit<->stand and static stand balance with the walker while OT assisted with clothing change. Instructed in safe clothing management to prevent falls. Functional Measure:  Barthel Index:    Bathin  Bladder: 0  Bowels: 5  Groomin  Dressin  Feedin  Mobility: 5  Stairs: 0  Toilet Use: 5  Transfer (Bed to Chair and Back): 10  Total: 30/100       The Barthel ADL Index: Guidelines  1. The index should be used as a record of what a patient does, not as a record of what a patient could do. 2. The main aim is to establish degree of independence from any help, physical or verbal, however minor and for whatever reason.   3. The need for supervision renders the patient not independent. 4. A patient's performance should be established using the best available evidence. Asking the patient, friends/relatives and nurses are the usual sources, but direct observation and common sense are also important. However direct testing is not needed. 5. Usually the patient's performance over the preceding 24-48 hours is important, but occasionally longer periods will be relevant. 6. Middle categories imply that the patient supplies over 50 per cent of the effort. 7. Use of aids to be independent is allowed. Score Interpretation (from [address] [address])    Independent   60-79 Minimally independent   40-59 Partially dependent   20-39 Very dependent   <20 Totally dependent     -Sunshine Browne., Barthel, DIdalmisW. (1965). Functional evaluation: the Barthel Index. [facility] ([address] [place] (1997). The Barthel activities of daily living index: self-reporting versus actual performance in the old (> or = 75 years). Journal of [address] [place] [address] 45(7), [address] [facility], JDENNYS.F, Remy Fine., [facility]. (1999). Measuring the change in disability after inpatient rehabilitation; comparison of the responsiveness of the Barthel Index and Functional [place] Measure. Journal of Neurology, Neurosurgery, and Psychiatry, 66(4), 246-013. LEXY Dowell, ISACC Bolden, & Jorden Homans, MIdalmisA. (2004) Assessment of post-stroke quality of life in cost-effectiveness studies: The usefulness of the Barthel Index and the EuroQoL-5D.  Quality of Life Research, 15, 822-93            Physical Therapy Evaluation Charge Determination   History Examination Presentation Decision-Making   LOW Complexity : Zero comorbidities / personal factors that will impact the outcome / POC LOW Complexity : 1-2 Standardized tests and measures addressing body structure, function, activity limitation and / or participation in recreation  LOW Complexity : Stable, uncomplicated  Other outcome measures Barthel 30/100  LOW       Based on the above components, the patient evaluation is determined to be of the following complexity level: LOW     Pain Rating:  Increased with mobility  Improved when sitting, stationary    Activity Tolerance:   Fair and pain    After treatment patient left in no apparent distress:   Sitting in chair, Call bell within reach, Caregiver / family present and RN and CM in the room, breakfast tray    COMMUNICATION/EDUCATION:   The patients plan of care was discussed with: Occupational therapist, Registered nurse and Case management. Fall prevention education was provided and the patient/caregiver indicated understanding., Patient/family have participated as able in goal setting and plan of care. and Patient/family agree to work toward stated goals and plan of care.     Thank you for this referral.  Kelley Omalley, PT   Time Calculation: 43 mins

## 2022-01-26 ENCOUNTER — DOCUMENTATION ONLY (OUTPATIENT)
Dept: CASE MANAGEMENT | Age: 87
End: 2022-01-26

## 2022-03-24 ENCOUNTER — HOSPITAL ENCOUNTER (OUTPATIENT)
Dept: LAB | Age: 87
Discharge: HOME | End: 2022-03-24
Payer: MEDICARE

## 2022-03-24 LAB
ALBUMIN SERPL-MCNC: 3.2 G/DL (ref 3.5–5)
ALBUMIN/GLOB SERPL: 0.9 {RATIO} (ref 1.1–2.2)
ALP SERPL-CCNC: 69 U/L (ref 45–117)
ALT SERPL-CCNC: 17 U/L (ref 12–78)
ANION GAP SERPL CALC-SCNC: 5 MMOL/L (ref 5–15)
AST SERPL W P-5'-P-CCNC: 20 U/L (ref 15–37)
BASOPHILS # BLD: 0 K/UL (ref 0–0.1)
BASOPHILS NFR BLD: 0 % (ref 0–1)
BILIRUB SERPL-MCNC: 0.4 MG/DL (ref 0.2–1)
BUN SERPL-MCNC: 18 MG/DL (ref 6–20)
BUN/CREAT SERPL: 17 (ref 12–20)
CA-I BLD-MCNC: 8.9 MG/DL (ref 8.5–10.1)
CHLORIDE SERPL-SCNC: 110 MMOL/L (ref 97–108)
CO2 SERPL-SCNC: 24 MMOL/L (ref 21–32)
CREAT SERPL-MCNC: 1.05 MG/DL (ref 0.55–1.02)
DIFFERENTIAL METHOD BLD: ABNORMAL
EOSINOPHIL # BLD: 0.6 K/UL (ref 0–0.4)
EOSINOPHIL NFR BLD: 9 % (ref 0–7)
ERYTHROCYTE [DISTWIDTH] IN BLOOD BY AUTOMATED COUNT: 15.3 % (ref 11.5–14.5)
FOLATE SERPL-MCNC: 8.8 NG/ML (ref 5–21)
GLOBULIN SER CALC-MCNC: 3.4 G/DL (ref 2–4)
GLUCOSE SERPL-MCNC: 100 MG/DL (ref 65–100)
HCT VFR BLD AUTO: 31 % (ref 35–47)
HGB BLD-MCNC: 9.6 G/DL (ref 11.5–16)
IMM GRANULOCYTES # BLD AUTO: 0 K/UL (ref 0–0.04)
IMM GRANULOCYTES NFR BLD AUTO: 0 % (ref 0–0.5)
LYMPHOCYTES # BLD: 1.7 K/UL (ref 0.8–3.5)
LYMPHOCYTES NFR BLD: 24 % (ref 12–49)
MCH RBC QN AUTO: 29.1 PG (ref 26–34)
MCHC RBC AUTO-ENTMCNC: 31 G/DL (ref 30–36.5)
MCV RBC AUTO: 93.9 FL (ref 80–99)
MONOCYTES # BLD: 0.5 K/UL (ref 0–1)
MONOCYTES NFR BLD: 8 % (ref 5–13)
NEUTS SEG # BLD: 4 K/UL (ref 1.8–8)
NEUTS SEG NFR BLD: 59 % (ref 32–75)
NRBC # BLD: 0 K/UL (ref 0–0.01)
NRBC BLD-RTO: 0 PER 100 WBC
PLATELET # BLD AUTO: 210 K/UL (ref 150–400)
PMV BLD AUTO: 10.8 FL (ref 8.9–12.9)
POTASSIUM SERPL-SCNC: 3.8 MMOL/L (ref 3.5–5.1)
PROT SERPL-MCNC: 6.6 G/DL (ref 6.4–8.2)
RBC # BLD AUTO: 3.3 M/UL (ref 3.8–5.2)
SODIUM SERPL-SCNC: 139 MMOL/L (ref 136–145)
TSH SERPL DL<=0.05 MIU/L-ACNC: 4.45 UIU/ML (ref 0.36–3.74)
VIT B12 SERPL-MCNC: 215 PG/ML (ref 193–986)
WBC # BLD AUTO: 6.9 K/UL (ref 3.6–11)

## 2022-03-24 PROCEDURE — 80053 COMPREHEN METABOLIC PANEL: CPT

## 2022-03-24 PROCEDURE — 82607 VITAMIN B-12: CPT

## 2022-03-24 PROCEDURE — 36415 COLL VENOUS BLD VENIPUNCTURE: CPT

## 2022-03-24 PROCEDURE — 84443 ASSAY THYROID STIM HORMONE: CPT

## 2022-03-24 PROCEDURE — 82746 ASSAY OF FOLIC ACID SERUM: CPT

## 2022-03-24 PROCEDURE — 85025 COMPLETE CBC W/AUTO DIFF WBC: CPT

## 2022-05-18 ENCOUNTER — APPOINTMENT (OUTPATIENT)
Dept: CT IMAGING | Age: 87
DRG: 392 | End: 2022-05-18
Attending: STUDENT IN AN ORGANIZED HEALTH CARE EDUCATION/TRAINING PROGRAM
Payer: MEDICARE

## 2022-05-18 ENCOUNTER — HOSPITAL ENCOUNTER (INPATIENT)
Age: 87
LOS: 5 days | Discharge: HOSPICE | DRG: 392 | End: 2022-05-23
Attending: STUDENT IN AN ORGANIZED HEALTH CARE EDUCATION/TRAINING PROGRAM | Admitting: INTERNAL MEDICINE
Payer: MEDICARE

## 2022-05-18 DIAGNOSIS — K57.92 DIVERTICULITIS: ICD-10-CM

## 2022-05-18 DIAGNOSIS — R10.32 ABDOMINAL PAIN, LLQ (LEFT LOWER QUADRANT): Primary | ICD-10-CM

## 2022-05-18 PROBLEM — K57.20 DIVERTICULITIS OF LARGE INTESTINE WITH PERFORATION: Status: ACTIVE | Noted: 2022-05-18

## 2022-05-18 LAB
ALBUMIN SERPL-MCNC: 3.2 G/DL (ref 3.5–5)
ALBUMIN/GLOB SERPL: 0.8 {RATIO} (ref 1.1–2.2)
ALP SERPL-CCNC: 99 U/L (ref 45–117)
ALT SERPL-CCNC: 27 U/L (ref 12–78)
ANION GAP SERPL CALC-SCNC: 5 MMOL/L (ref 5–15)
APPEARANCE UR: CLEAR
AST SERPL W P-5'-P-CCNC: 21 U/L (ref 15–37)
BACTERIA URNS QL MICRO: NEGATIVE /HPF
BASOPHILS # BLD: 0 K/UL (ref 0–0.1)
BASOPHILS NFR BLD: 0 % (ref 0–1)
BILIRUB SERPL-MCNC: 0.5 MG/DL (ref 0.2–1)
BILIRUB UR QL: NEGATIVE
BUN SERPL-MCNC: 30 MG/DL (ref 6–20)
BUN/CREAT SERPL: 31 (ref 12–20)
CA-I BLD-MCNC: 9.5 MG/DL (ref 8.5–10.1)
CHLORIDE SERPL-SCNC: 104 MMOL/L (ref 97–108)
CO2 SERPL-SCNC: 28 MMOL/L (ref 21–32)
COLOR UR: ABNORMAL
CREAT SERPL-MCNC: 0.96 MG/DL (ref 0.55–1.02)
DIFFERENTIAL METHOD BLD: ABNORMAL
EOSINOPHIL # BLD: 0.2 K/UL (ref 0–0.4)
EOSINOPHIL NFR BLD: 2 % (ref 0–7)
ERYTHROCYTE [DISTWIDTH] IN BLOOD BY AUTOMATED COUNT: 15.1 % (ref 11.5–14.5)
GLOBULIN SER CALC-MCNC: 3.8 G/DL (ref 2–4)
GLUCOSE SERPL-MCNC: 102 MG/DL (ref 65–100)
GLUCOSE UR STRIP.AUTO-MCNC: NEGATIVE MG/DL
HCT VFR BLD AUTO: 34.5 % (ref 35–47)
HGB BLD-MCNC: 11.2 G/DL (ref 11.5–16)
HGB UR QL STRIP: ABNORMAL
IMM GRANULOCYTES # BLD AUTO: 0 K/UL (ref 0–0.04)
IMM GRANULOCYTES NFR BLD AUTO: 0 % (ref 0–0.5)
KETONES UR QL STRIP.AUTO: NEGATIVE MG/DL
LEUKOCYTE ESTERASE UR QL STRIP.AUTO: NEGATIVE
LIPASE SERPL-CCNC: 145 U/L (ref 73–393)
LYMPHOCYTES # BLD: 1.4 K/UL (ref 0.8–3.5)
LYMPHOCYTES NFR BLD: 14 % (ref 12–49)
MCH RBC QN AUTO: 29.9 PG (ref 26–34)
MCHC RBC AUTO-ENTMCNC: 32.5 G/DL (ref 30–36.5)
MCV RBC AUTO: 92.2 FL (ref 80–99)
MONOCYTES # BLD: 0.8 K/UL (ref 0–1)
MONOCYTES NFR BLD: 7 % (ref 5–13)
NEUTS SEG # BLD: 7.9 K/UL (ref 1.8–8)
NEUTS SEG NFR BLD: 77 % (ref 32–75)
NITRITE UR QL STRIP.AUTO: NEGATIVE
NRBC # BLD: 0 K/UL (ref 0–0.01)
NRBC BLD-RTO: 0 PER 100 WBC
PH UR STRIP: 7 [PH] (ref 5–8)
PLATELET # BLD AUTO: 147 K/UL (ref 150–400)
PMV BLD AUTO: 11.4 FL (ref 8.9–12.9)
POTASSIUM SERPL-SCNC: 4.2 MMOL/L (ref 3.5–5.1)
PROT SERPL-MCNC: 7 G/DL (ref 6.4–8.2)
PROT UR STRIP-MCNC: NEGATIVE MG/DL
RBC # BLD AUTO: 3.74 M/UL (ref 3.8–5.2)
RBC #/AREA URNS HPF: ABNORMAL /HPF (ref 0–5)
SODIUM SERPL-SCNC: 137 MMOL/L (ref 136–145)
SP GR UR REFRACTOMETRY: 1.02 (ref 1–1.03)
UA: UC IF INDICATED,UAUC: ABNORMAL
UROBILINOGEN UR QL STRIP.AUTO: 0.1 EU/DL (ref 0.1–1)
WBC # BLD AUTO: 10.4 K/UL (ref 3.6–11)
WBC URNS QL MICRO: ABNORMAL /HPF (ref 0–4)

## 2022-05-18 PROCEDURE — 74011250636 HC RX REV CODE- 250/636: Performed by: STUDENT IN AN ORGANIZED HEALTH CARE EDUCATION/TRAINING PROGRAM

## 2022-05-18 PROCEDURE — 83690 ASSAY OF LIPASE: CPT

## 2022-05-18 PROCEDURE — 74011000258 HC RX REV CODE- 258: Performed by: INTERNAL MEDICINE

## 2022-05-18 PROCEDURE — 81001 URINALYSIS AUTO W/SCOPE: CPT

## 2022-05-18 PROCEDURE — 87040 BLOOD CULTURE FOR BACTERIA: CPT

## 2022-05-18 PROCEDURE — 80053 COMPREHEN METABOLIC PANEL: CPT

## 2022-05-18 PROCEDURE — 74011000258 HC RX REV CODE- 258: Performed by: STUDENT IN AN ORGANIZED HEALTH CARE EDUCATION/TRAINING PROGRAM

## 2022-05-18 PROCEDURE — 74011250636 HC RX REV CODE- 250/636: Performed by: HOSPITALIST

## 2022-05-18 PROCEDURE — 74177 CT ABD & PELVIS W/CONTRAST: CPT

## 2022-05-18 PROCEDURE — 74011000250 HC RX REV CODE- 250: Performed by: INTERNAL MEDICINE

## 2022-05-18 PROCEDURE — 74011000636 HC RX REV CODE- 636: Performed by: STUDENT IN AN ORGANIZED HEALTH CARE EDUCATION/TRAINING PROGRAM

## 2022-05-18 PROCEDURE — 99285 EMERGENCY DEPT VISIT HI MDM: CPT

## 2022-05-18 PROCEDURE — 74011250637 HC RX REV CODE- 250/637: Performed by: STUDENT IN AN ORGANIZED HEALTH CARE EDUCATION/TRAINING PROGRAM

## 2022-05-18 PROCEDURE — 85025 COMPLETE CBC W/AUTO DIFF WBC: CPT

## 2022-05-18 PROCEDURE — 74011250636 HC RX REV CODE- 250/636: Performed by: INTERNAL MEDICINE

## 2022-05-18 PROCEDURE — 36415 COLL VENOUS BLD VENIPUNCTURE: CPT

## 2022-05-18 PROCEDURE — 65270000029 HC RM PRIVATE

## 2022-05-18 RX ORDER — LOSARTAN POTASSIUM 100 MG/1
100 TABLET ORAL DAILY
COMMUNITY
Start: 2022-05-17

## 2022-05-18 RX ORDER — ONDANSETRON 4 MG/1
4 TABLET, FILM COATED ORAL
COMMUNITY

## 2022-05-18 RX ORDER — ONDANSETRON 4 MG/1
4 TABLET, ORALLY DISINTEGRATING ORAL
Status: DISCONTINUED | OUTPATIENT
Start: 2022-05-18 | End: 2022-05-23 | Stop reason: HOSPADM

## 2022-05-18 RX ORDER — SODIUM CHLORIDE 0.9 % (FLUSH) 0.9 %
5-40 SYRINGE (ML) INJECTION AS NEEDED
Status: DISCONTINUED | OUTPATIENT
Start: 2022-05-18 | End: 2022-05-23 | Stop reason: HOSPADM

## 2022-05-18 RX ORDER — LEVOTHYROXINE SODIUM 100 UG/1
50 TABLET ORAL
Status: DISCONTINUED | OUTPATIENT
Start: 2022-05-19 | End: 2022-05-19 | Stop reason: ALTCHOICE

## 2022-05-18 RX ORDER — ASPIRIN 81 MG/1
81 TABLET ORAL DAILY
Status: DISCONTINUED | OUTPATIENT
Start: 2022-05-19 | End: 2022-05-23 | Stop reason: HOSPADM

## 2022-05-18 RX ORDER — ACETAMINOPHEN 500 MG
1000 TABLET ORAL
COMMUNITY

## 2022-05-18 RX ORDER — ENOXAPARIN SODIUM 100 MG/ML
30 INJECTION SUBCUTANEOUS DAILY
Status: DISCONTINUED | OUTPATIENT
Start: 2022-05-19 | End: 2022-05-23 | Stop reason: HOSPADM

## 2022-05-18 RX ORDER — CHOLESTYRAMINE 4 G/4.8G
1 POWDER, FOR SUSPENSION ORAL
Status: DISCONTINUED | OUTPATIENT
Start: 2022-05-19 | End: 2022-05-23 | Stop reason: HOSPADM

## 2022-05-18 RX ORDER — AMLODIPINE BESYLATE 5 MG/1
5 TABLET ORAL DAILY
Status: DISCONTINUED | OUTPATIENT
Start: 2022-05-19 | End: 2022-05-23 | Stop reason: HOSPADM

## 2022-05-18 RX ORDER — MELATONIN
1000 DAILY
Status: DISCONTINUED | OUTPATIENT
Start: 2022-05-19 | End: 2022-05-23 | Stop reason: HOSPADM

## 2022-05-18 RX ORDER — POLYETHYLENE GLYCOL 3350 17 G/17G
17 POWDER, FOR SOLUTION ORAL
COMMUNITY

## 2022-05-18 RX ORDER — CHOLESTYRAMINE 4 G/9G
1 POWDER, FOR SUSPENSION ORAL
COMMUNITY
Start: 2022-03-18

## 2022-05-18 RX ORDER — ATORVASTATIN CALCIUM 10 MG/1
10 TABLET, FILM COATED ORAL
Status: DISCONTINUED | OUTPATIENT
Start: 2022-05-18 | End: 2022-05-23 | Stop reason: HOSPADM

## 2022-05-18 RX ORDER — FERROUS SULFATE 325(65) MG
325 TABLET, DELAYED RELEASE (ENTERIC COATED) ORAL DAILY
COMMUNITY

## 2022-05-18 RX ORDER — ACETAMINOPHEN 500 MG
1000 TABLET ORAL
Status: COMPLETED | OUTPATIENT
Start: 2022-05-18 | End: 2022-05-18

## 2022-05-18 RX ORDER — ONDANSETRON 2 MG/ML
4 INJECTION INTRAMUSCULAR; INTRAVENOUS
Status: DISCONTINUED | OUTPATIENT
Start: 2022-05-18 | End: 2022-05-23 | Stop reason: HOSPADM

## 2022-05-18 RX ORDER — FLUOCINONIDE TOPICAL SOLUTION USP, 0.05% 0.5 MG/ML
SOLUTION TOPICAL EVERY 6 HOURS
COMMUNITY

## 2022-05-18 RX ORDER — LANOLIN ALCOHOL/MO/W.PET/CERES
500 CREAM (GRAM) TOPICAL DAILY
Status: DISCONTINUED | OUTPATIENT
Start: 2022-05-19 | End: 2022-05-23 | Stop reason: HOSPADM

## 2022-05-18 RX ORDER — SODIUM CHLORIDE 9 MG/ML
150 INJECTION, SOLUTION INTRAVENOUS CONTINUOUS
Status: DISCONTINUED | OUTPATIENT
Start: 2022-05-18 | End: 2022-05-21

## 2022-05-18 RX ORDER — HYDRALAZINE HYDROCHLORIDE 20 MG/ML
10 INJECTION INTRAMUSCULAR; INTRAVENOUS
Status: DISCONTINUED | OUTPATIENT
Start: 2022-05-18 | End: 2022-05-23 | Stop reason: HOSPADM

## 2022-05-18 RX ORDER — LOSARTAN POTASSIUM 50 MG/1
100 TABLET ORAL DAILY
Status: DISCONTINUED | OUTPATIENT
Start: 2022-05-19 | End: 2022-05-23 | Stop reason: HOSPADM

## 2022-05-18 RX ORDER — ACETAMINOPHEN 650 MG/1
650 SUPPOSITORY RECTAL
Status: DISCONTINUED | OUTPATIENT
Start: 2022-05-18 | End: 2022-05-23 | Stop reason: HOSPADM

## 2022-05-18 RX ORDER — ACETAMINOPHEN 325 MG/1
650 TABLET ORAL
Status: DISCONTINUED | OUTPATIENT
Start: 2022-05-18 | End: 2022-05-23 | Stop reason: HOSPADM

## 2022-05-18 RX ORDER — AA/PROT/LYSINE/METHIO/VIT C/B6 50-12.5 MG
10 TABLET ORAL DAILY
Status: DISCONTINUED | OUTPATIENT
Start: 2022-05-19 | End: 2022-05-23 | Stop reason: HOSPADM

## 2022-05-18 RX ORDER — SODIUM CHLORIDE 0.9 % (FLUSH) 0.9 %
5-40 SYRINGE (ML) INJECTION EVERY 8 HOURS
Status: DISCONTINUED | OUTPATIENT
Start: 2022-05-18 | End: 2022-05-23 | Stop reason: HOSPADM

## 2022-05-18 RX ORDER — HYDROMORPHONE HYDROCHLORIDE 1 MG/ML
0.5 INJECTION, SOLUTION INTRAMUSCULAR; INTRAVENOUS; SUBCUTANEOUS
Status: DISPENSED | OUTPATIENT
Start: 2022-05-18 | End: 2022-05-21

## 2022-05-18 RX ORDER — LANOLIN ALCOHOL/MO/W.PET/CERES
500 CREAM (GRAM) TOPICAL DAILY
COMMUNITY
Start: 2022-05-17

## 2022-05-18 RX ORDER — TRIAMCINOLONE ACETONIDE 1 MG/G
CREAM TOPICAL
COMMUNITY

## 2022-05-18 RX ADMIN — HYDROMORPHONE HYDROCHLORIDE 0.5 MG: 1 INJECTION, SOLUTION INTRAMUSCULAR; INTRAVENOUS; SUBCUTANEOUS at 23:37

## 2022-05-18 RX ADMIN — IOPAMIDOL 100 ML: 755 INJECTION, SOLUTION INTRAVENOUS at 16:11

## 2022-05-18 RX ADMIN — PIPERACILLIN SODIUM AND TAZOBACTAM SODIUM 3.38 G: 3; .375 INJECTION, POWDER, LYOPHILIZED, FOR SOLUTION INTRAVENOUS at 23:40

## 2022-05-18 RX ADMIN — SODIUM CHLORIDE 150 ML/HR: 9 INJECTION, SOLUTION INTRAVENOUS at 23:35

## 2022-05-18 RX ADMIN — HYDRALAZINE HYDROCHLORIDE 10 MG: 20 INJECTION, SOLUTION INTRAMUSCULAR; INTRAVENOUS at 21:35

## 2022-05-18 RX ADMIN — SODIUM CHLORIDE, PRESERVATIVE FREE 10 ML: 5 INJECTION INTRAVENOUS at 21:37

## 2022-05-18 RX ADMIN — PIPERACILLIN AND TAZOBACTAM 4.5 G: 4; .5 INJECTION, POWDER, FOR SOLUTION INTRAVENOUS at 16:51

## 2022-05-18 RX ADMIN — ACETAMINOPHEN 1000 MG: 500 TABLET ORAL at 14:57

## 2022-05-18 RX ADMIN — SODIUM CHLORIDE 150 ML/HR: 9 INJECTION, SOLUTION INTRAVENOUS at 18:13

## 2022-05-18 RX ADMIN — SODIUM CHLORIDE 500 ML: 9 INJECTION, SOLUTION INTRAVENOUS at 14:57

## 2022-05-18 RX ADMIN — HYDROMORPHONE HYDROCHLORIDE 0.5 MG: 1 INJECTION, SOLUTION INTRAMUSCULAR; INTRAVENOUS; SUBCUTANEOUS at 18:39

## 2022-05-18 NOTE — ED PROVIDER NOTES
[id number]  EMERGENCY DEPARTMENT ENCOUNTER NOTE    Date: 5/18/2022  Patient Name: Rossana Saravia    History of Presenting Illness     Chief Complaint   Patient presents with    Abdominal Pain     HPI: Rossana Saravia, 80 y.o. female with a past medical history and outpatient medications as listed and reviewed below  presents for abdominal pain. She reports a 1 month history of continuous mild abdominal pain that worsened yesterday. The pain is in the left lower part and worsens with movement. She denies any chest pain or shortness of breath. .  No fevers or chills. No dysuria or hematuria. No vaginal bleeding or discharge. No lightheadedness or dizziness. No vomiting. No constipation. Medical History   I reviewed the medical, surgical, family, and social history, as well as allergies:    PCP: Frank Long MD    Past Medical History:  Past Medical History:   Diagnosis Date    Arthritis     Hypertension     Hypothyroid      Past Surgical History:  Past Surgical History:   Procedure Laterality Date    HX CATARACT REMOVAL      bilat    HX ORTHOPAEDIC      left shoulder arthoscopy     Current Outpatient Medications:  Current Outpatient Medications   Medication Instructions    amLODIPine (NORVASC) 5 mg, DAILY    aspirin delayed-release 81 mg, Oral, 2 TIMES DAILY    candesartan (ATACAND) 16 mg, DAILY    cholecalciferol (VITAMIN D3) 1,000 Units, Oral, DAILY    coenzyme q10 (Co Q-10) 10 mg cap 1 Tablet, Oral    diclofenac (VOLTAREN) 2 g, Topical, 4 TIMES DAILY, As needed for itching for back and legs.      levothyroxine (SYNTHROID) 50 mcg, DAILY BEFORE BREAKFAST    simvastatin (ZOCOR) 20 mg, EVERY BEDTIME      Family History:  Family History   Problem Relation Age of Onset    Cancer Father     Cancer Sister      Social History:  Social History     Tobacco Use    Smoking status: Never Smoker    Smokeless tobacco: Never Used   Substance Use Topics    Alcohol use: Yes     Comment: social    Drug use: No     Allergies: Allergies   Allergen Reactions    Celecoxib Unknown (comments)       Review of Systems     Review of Systems  Negative: All other systems negative. Physical Exam and Vital Signs   Vital Signs - Reviewed the patient's vital signs. Patient Vitals for the past 12 hrs:   Temp Pulse Resp BP SpO2   05/18/22 1500  83 18 (!) 169/94 98 %   05/18/22 1239 98 °F (36.7 °C) 74 18 (!) 168/77 98 %     Physical Exam:    GENERAL: awake, alert, cooperative, not in distress  HEENT:  * Pupils equal, EOMI  * Head atraumatic  CV:  * audible heart sounds  * warm and perfused extremities bilaterally  PULMONARY: Good air movement, no wheezes, no crackles  ABDOMEN/: soft, no distension, no guarding, no suprapubic tenderness, noted LLQ abdominal tenderness  EXTREMITIES/BACK: warm and perfused, no tenderness, no edema  SKIN: no rashes or signs of trauma  NEURO:  * Speech clear  * Moves U&LE to command    Medical Decision Making   - I am the first and primary provider for this patient and am the primary provider of record. - I reviewed the vital signs, available nursing notes, past medical history, past surgical history, family history and social history. - Initial assessment performed. The patients presenting problems have been discussed, and the staff are in agreement with the care plan formulated and outlined with them. I have encouraged them to ask questions as they arise throughout their visit. - Available medical records, nursing notes, old EKGs, and EMS run sheets (if patient was EMS transported) were reviewed    MDM:   Patient is a 80 y.o. female presenting for AP. Vitals reveal no significant abnormalities and physical exam reveals LLQ tenderness. Based on the history, physical exam, risk factors, and vitals signs, differential includes: Diverticulitis, colitis, enteritis, DANIELA, UTI. Will initiate workup and symptomatic treatment.  See ED Course and Reassessment for results and interpretations. Results     Labs:  Recent Results (from the past 12 hour(s))   CBC WITH AUTOMATED DIFF    Collection Time: 05/18/22  2:02 PM   Result Value Ref Range    WBC 10.4 3.6 - 11.0 K/uL    RBC 3.74 (L) 3.80 - 5.20 M/uL    HGB 11.2 (L) 11.5 - 16.0 g/dL    HCT 34.5 (L) 35.0 - 47.0 %    MCV 92.2 80.0 - 99.0 FL    MCH 29.9 26.0 - 34.0 PG    MCHC 32.5 30.0 - 36.5 g/dL    RDW 15.1 (H) 11.5 - 14.5 %    PLATELET 813 (L) 730 - 400 K/uL    MPV 11.4 8.9 - 12.9 FL    NRBC 0.0 0.0  WBC    ABSOLUTE NRBC 0.00 0.00 - 0.01 K/uL    NEUTROPHILS 77 (H) 32 - 75 %    LYMPHOCYTES 14 12 - 49 %    MONOCYTES 7 5 - 13 %    EOSINOPHILS 2 0 - 7 %    BASOPHILS 0 0 - 1 %    IMMATURE GRANULOCYTES 0 0 - 0.5 %    ABS. NEUTROPHILS 7.9 1.8 - 8.0 K/UL    ABS. LYMPHOCYTES 1.4 0.8 - 3.5 K/UL    ABS. MONOCYTES 0.8 0.0 - 1.0 K/UL    ABS. EOSINOPHILS 0.2 0.0 - 0.4 K/UL    ABS. BASOPHILS 0.0 0.0 - 0.1 K/UL    ABS. IMM. GRANS. 0.0 0.00 - 0.04 K/UL    DF AUTOMATED     METABOLIC PANEL, COMPREHENSIVE    Collection Time: 05/18/22  2:02 PM   Result Value Ref Range    Sodium 137 136 - 145 mmol/L    Potassium 4.2 3.5 - 5.1 mmol/L    Chloride 104 97 - 108 mmol/L    CO2 28 21 - 32 mmol/L    Anion gap 5 5 - 15 mmol/L    Glucose 102 (H) 65 - 100 mg/dL    BUN 30 (H) 6 - 20 mg/dL    Creatinine 0.96 0.55 - 1.02 mg/dL    BUN/Creatinine ratio 31 (H) 12 - 20      GFR est AA >60 >60 ml/min/1.73m2    GFR est non-AA 54 (L) >60 ml/min/1.73m2    Calcium 9.5 8.5 - 10.1 mg/dL    Bilirubin, total 0.5 0.2 - 1.0 mg/dL    AST (SGOT) 21 15 - 37 U/L    ALT (SGPT) 27 12 - 78 U/L    Alk.  phosphatase 99 45 - 117 U/L    Protein, total 7.0 6.4 - 8.2 g/dL    Albumin 3.2 (L) 3.5 - 5.0 g/dL    Globulin 3.8 2.0 - 4.0 g/dL    A-G Ratio 0.8 (L) 1.1 - 2.2     LIPASE    Collection Time: 05/18/22  2:02 PM   Result Value Ref Range    Lipase 145 73 - 393 U/L     Radiologic Studies:  CT Results  (Last 48 hours)    None        CXR Results  (Last 48 hours) None        Medications ordered:  Medications   sodium chloride 0.9 % bolus infusion 500 mL (500 mL IntraVENous New Bag 5/18/22 1457)   acetaminophen (TYLENOL) tablet 1,000 mg (1,000 mg Oral Given 5/18/22 1457)     ED Course and Reassessment     ED Course:     ED Course as of 05/18/22 1602   Wed May 18, 2022   1426 CBC does not show any evidence of acute process. Leukocytosis not present to suggest infection. Hemoglobin not suggestive of acute anemia. [SS]   1443 No significant electrolyte derangements. Creatinine is not elevated more than baseline range making DANIELA unlikely. No significant transaminitis noted. Normal bilirubin. Lipase is not significantly elevated. [SS]   1550 Patient received as sign out from Dr. Mahamed Caal, LLQ tenderness, will follow up UA and CT a/pelvis. [PZ]      ED Course User Index  [PZ] Brionna Garay MD  [SS] Juarez Kirk MD       Reassessment:    Was signed out to incoming provider pending CT abdomen and urinalysis. Final Disposition     Pending UA and CT. Diagnosis     Clinical Impression:   1. Abdominal pain, LLQ (left lower quadrant)      Attestations:    João Tierney MD    Please note that this dictation was completed with [employer], the computer voice recognition software. Quite often unanticipated grammatical, syntax, homophones, and other interpretive errors are inadvertently transcribed by the computer software. Please disregard these errors. Please excuse any errors that have escaped final proofreading. Thank you.

## 2022-05-18 NOTE — H&P
GENERAL GENERIC H&P/CONSULT    Subjective:    89F with past medical history for arthritis, hypertension, hypothyroidism, recent falls, lives in a womens home because of physical debility. 5/18/22 presents to hospital because of abdominal pains. Development over the previous one month. Primarily in the left lower quadrant. Crampy and intermittent. Worsens with movement. Associated with nausea. During the ED course CT imaging suggestive of sigmoid diverticulitis with microperforation. Managed with fluids and broad spectrum antibiotics and I have been asked to admit her to hospital.      Past Medical History:   Diagnosis Date    Arthritis     Hypertension     Hypothyroid       Past Surgical History:   Procedure Laterality Date    HX CATARACT REMOVAL      bilat    HX ORTHOPAEDIC      left shoulder arthoscopy      Prior to Admission medications    Medication Sig Start Date End Date Taking? Authorizing Provider   cholestyramine Shamika Farrukh) 4 gram packet  3/18/22   Provider, Historical   cyanocobalamin (VITAMIN B12) 500 mcg tablet  5/17/22   Provider, Historical   losartan (COZAAR) 100 mg tablet  5/17/22   Provider, Historical   diclofenac (VOLTAREN) 1 % gel Apply 2 g to affected area four (4) times daily. As needed for itching for back and legs. Nadia Soto MD   cholecalciferol (Vitamin D3) (1000 Units /25 mcg) tablet Take 1,000 Units by mouth daily. Nadia Soto MD   coenzyme q10 (Co Q-10) 10 mg cap Take 1 Tablet by mouth. Nadia Soto MD   aspirin delayed-release 81 mg tablet Take 1 Tablet by mouth two (2) times a day. 1/24/22   Musa Quintana MD   simvastatin (ZOCOR) 20 mg tablet Take 20 mg by mouth nightly. Nadia Soto MD   amLODIPine (NORVASC) 5 mg tablet Take 5 mg by mouth daily. Nadia Soto MD   levothyroxine (SYNTHROID) 50 mcg tablet Take 50 mcg by mouth Daily (before breakfast).       Nadia Soto MD     Allergies   Allergen Reactions    Celecoxib Unknown (comments)      Social History     Tobacco Use    Smoking status: Never Smoker    Smokeless tobacco: Never Used   Substance Use Topics    Alcohol use: Yes     Comment: social      Family History   Problem Relation Age of Onset    Cancer Father     Cancer Sister       Review of Systems   All other systems reviewed and are negative. Objective:    No intake/output data recorded. No intake/output data recorded. Patient Vitals for the past 8 hrs:   BP Temp Pulse Resp SpO2 Height Weight   05/18/22 1500 (!) 169/94  83 18 98 %     05/18/22 1239 (!) 168/77 98 °F (36.7 °C) 74 18 98 % 5' 1\" (1.549 m) 68 kg (150 lb)     Physical Exam  Vitals and nursing note reviewed. HENT:      Head: Normocephalic and atraumatic. Nose: Nose normal.      Mouth/Throat:      Mouth: Mucous membranes are dry. Eyes:      Extraocular Movements: Extraocular movements intact. Pupils: Pupils are equal, round, and reactive to light. Cardiovascular:      Rate and Rhythm: Normal rate and regular rhythm. Pulses: Normal pulses. Heart sounds: Normal heart sounds. Pulmonary:      Effort: Pulmonary effort is normal.      Breath sounds: Normal breath sounds. Abdominal:      General: Abdomen is flat. Palpations: Abdomen is soft. Tenderness: There is abdominal tenderness. There is no guarding or rebound. Comments: LLQ tenderness   Musculoskeletal:      Cervical back: Normal range of motion and neck supple. Skin:     General: Skin is dry. Neurological:      General: No focal deficit present. Mental Status: She is alert. Mental status is at baseline.    Psychiatric:         Mood and Affect: Mood normal.          Labs:    Recent Results (from the past 24 hour(s))   CBC WITH AUTOMATED DIFF    Collection Time: 05/18/22  2:02 PM   Result Value Ref Range    WBC 10.4 3.6 - 11.0 K/uL    RBC 3.74 (L) 3.80 - 5.20 M/uL    HGB 11.2 (L) 11.5 - 16.0 g/dL    HCT 34.5 (L) 35.0 - 47.0 %    MCV 92.2 80.0 - 99.0 FL    MCH 29.9 26.0 - 34.0 PG    MCHC 32.5 30.0 - 36.5 g/dL    RDW 15.1 (H) 11.5 - 14.5 %    PLATELET 568 (L) 138 - 400 K/uL    MPV 11.4 8.9 - 12.9 FL    NRBC 0.0 0.0  WBC    ABSOLUTE NRBC 0.00 0.00 - 0.01 K/uL    NEUTROPHILS 77 (H) 32 - 75 %    LYMPHOCYTES 14 12 - 49 %    MONOCYTES 7 5 - 13 %    EOSINOPHILS 2 0 - 7 %    BASOPHILS 0 0 - 1 %    IMMATURE GRANULOCYTES 0 0 - 0.5 %    ABS. NEUTROPHILS 7.9 1.8 - 8.0 K/UL    ABS. LYMPHOCYTES 1.4 0.8 - 3.5 K/UL    ABS. MONOCYTES 0.8 0.0 - 1.0 K/UL    ABS. EOSINOPHILS 0.2 0.0 - 0.4 K/UL    ABS. BASOPHILS 0.0 0.0 - 0.1 K/UL    ABS. IMM. GRANS. 0.0 0.00 - 0.04 K/UL    DF AUTOMATED     METABOLIC PANEL, COMPREHENSIVE    Collection Time: 05/18/22  2:02 PM   Result Value Ref Range    Sodium 137 136 - 145 mmol/L    Potassium 4.2 3.5 - 5.1 mmol/L    Chloride 104 97 - 108 mmol/L    CO2 28 21 - 32 mmol/L    Anion gap 5 5 - 15 mmol/L    Glucose 102 (H) 65 - 100 mg/dL    BUN 30 (H) 6 - 20 mg/dL    Creatinine 0.96 0.55 - 1.02 mg/dL    BUN/Creatinine ratio 31 (H) 12 - 20      GFR est AA >60 >60 ml/min/1.73m2    GFR est non-AA 54 (L) >60 ml/min/1.73m2    Calcium 9.5 8.5 - 10.1 mg/dL    Bilirubin, total 0.5 0.2 - 1.0 mg/dL    AST (SGOT) 21 15 - 37 U/L    ALT (SGPT) 27 12 - 78 U/L    Alk.  phosphatase 99 45 - 117 U/L    Protein, total 7.0 6.4 - 8.2 g/dL    Albumin 3.2 (L) 3.5 - 5.0 g/dL    Globulin 3.8 2.0 - 4.0 g/dL    A-G Ratio 0.8 (L) 1.1 - 2.2     LIPASE    Collection Time: 05/18/22  2:02 PM   Result Value Ref Range    Lipase 145 73 - 393 U/L   URINALYSIS W/ REFLEX CULTURE    Collection Time: 05/18/22  4:18 PM    Specimen: Urine   Result Value Ref Range    Color Yellow/Straw      Appearance Clear Clear      Specific gravity 1.016 1.003 - 1.030      pH (UA) 7.0 5.0 - 8.0      Protein Negative Negative mg/dL    Glucose Negative Negative mg/dL    Ketone Negative Negative mg/dL    Bilirubin Negative Negative      Blood Moderate (A) Negative      Urobilinogen 0.1 0.1 - 1.0 EU/dL    Nitrites Negative Negative Leukocyte Esterase Negative Negative      UA:UC IF INDICATED Culture not indicated by UA result Culture not indicated by UA result      WBC 0-4 0 - 4 /hpf    RBC 10-20 0 - 5 /hpf    Bacteria Negative Negative /hpf       ECG: ordered    CT Results  (Last 48 hours)               05/18/22 1610  CT ABD PELV W CONT Final result    Impression:  Findings most concerning for diverticulitis involving the sigmoid colon with   localized/microscopic perforation. No free air or drainable abscess. Given   patient age, this warrants follow-up with colonoscopy after acute episode. Mild dilatation of the common duct and the pancreatic duct without discrete mass   seen in the pancreatic head. Obstruction at the ampullar cannot be excluded. Recommend correlation with MRCP with and without contrast, or ERCP. COMMUNICATION:   The result was discussed with Dr. Kailyn Lindsay. Narrative:  EXAM: CT ABD PELV W CONT       INDICATION: LLQ tenderness, concern for diverticulitis       COMPARISON: CT pelvis without contrast January 24, 2022. CONTRAST: 100 mL of Isovue-370. No PO contrast       TECHNIQUE:     Multislice helical CT was performed from the diaphragm to the symphysis pubis   during uneventful rapid bolus intravenous contrast administration. Contiguous   axial images were reconstructed and lung and soft tissue windows were generated. Coronal and sagittal reformations were generated. CT dose reduction was achieved   through use of a standardized protocol tailored for this examination and   automatic exposure control for dose modulation. FINDINGS:   Lower chest: Basal atelectasis. Liver, biliary tree, and gallbladder: No focal hepatic mass. Gallbladder is   within normal limits. The common duct (13 mm) and pancreatic duct (7 mm) both mildly dilated without   discrete mass seen in the pancreatic head. There is minimal intrahepatic biliary   ductal dilatation. Spleen: Within normal limits. Pancreas: Atrophic. Kidneys and adrenals: Bilateral renal cysts. No enhancing renal mass,   obstructing calculus, or hydronephrosis. Unremarkable adrenals. Bowels: No bowel wall thickening or dilatation. Appendix is normal.       Extensive colonic diverticulosis with surrounding fat stranding (2-112), and a   gas pocket (2-106) by the sigmoid colon. No pneumoperitoneum. Peritoneum and retroperitoneum: No ascites or pneumoperitoneum. No   lymphadenopathy or aortic aneurysm. Pelvis: Bulky calcification in the uterus. Bones and abdominal wall: No destructive bone lesion. No abdominal wall mass or   hernia. Assessment:  Active Problems:    Diverticulitis of large intestine with perforation (5/18/2022)    89F with past medical history for arthritis, hypertension, hypothyroidism, recent falls, lives in a womens home because of physical debility. 5/18/22 presents to hospital because of abdominal pains. Development over the previous one month. Primarily in the left lower quadrant. Crampy and intermittent. Worsens with movement. Associated with nausea. During the ED course CT imaging suggestive of sigmoid diverticulitis with microperforation. Managed with fluids and broad spectrum antibiotics and I have been asked to admit her to hospital.    Plan:    1) Sigmoid diverticulitis with microperforation, presenting with abdominal pains slowly developing over the past one month. Admit to hospital   Supportive care   Intravenous fluids   Zosyn   Surgery on board    2) Cardiovascular issues including hypertension and dyslipidemia.      Telemetry monitoring   Continue home meds aspirin, amlodipine, losartan, simvastatin    3) DVT prophylaxis with lovenox      Signed:  Roland Avendano MD 5/18/2022

## 2022-05-18 NOTE — ED NOTES
TRANSFER - OUT REPORT:    Verbal report given to Constantino Bee (name) on [facility]  being transferred to  (unit) for routine progression of care       Report consisted of patients Situation, Background, Assessment and   Recommendations(SBAR). Information from the following report(s) SBAR and ED Summary was reviewed with the receiving nurse. Lines:   Peripheral IV 05/18/22 Anterior;Left;Proximal Forearm (Active)        Opportunity for questions and clarification was provided.       Patient transported with:   Monitor  Tech

## 2022-05-18 NOTE — ACP (ADVANCE CARE PLANNING)
Advance Care Planning   Healthcare Decision Maker:       Primary Decision Maker: Rosetta Patel [facility] - [phone]

## 2022-05-19 ENCOUNTER — APPOINTMENT (OUTPATIENT)
Dept: NON INVASIVE DIAGNOSTICS | Age: 87
DRG: 392 | End: 2022-05-19
Attending: INTERNAL MEDICINE
Payer: MEDICARE

## 2022-05-19 ENCOUNTER — APPOINTMENT (OUTPATIENT)
Dept: CT IMAGING | Age: 87
DRG: 392 | End: 2022-05-19
Attending: SURGERY
Payer: MEDICARE

## 2022-05-19 PROBLEM — E44.0 MODERATE PROTEIN-ENERGY MALNUTRITION (HCC): Status: ACTIVE | Noted: 2022-05-19

## 2022-05-19 LAB
ALBUMIN SERPL-MCNC: 3.1 G/DL (ref 3.5–5)
ALBUMIN/GLOB SERPL: 0.9 {RATIO} (ref 1.1–2.2)
ALP SERPL-CCNC: 96 U/L (ref 45–117)
ALT SERPL-CCNC: 21 U/L (ref 12–78)
ANION GAP SERPL CALC-SCNC: 8 MMOL/L (ref 5–15)
AST SERPL W P-5'-P-CCNC: 21 U/L (ref 15–37)
ATRIAL RATE: 76 BPM
BASOPHILS # BLD: 0 K/UL (ref 0–0.1)
BASOPHILS NFR BLD: 0 % (ref 0–1)
BILIRUB SERPL-MCNC: 0.6 MG/DL (ref 0.2–1)
BUN SERPL-MCNC: 20 MG/DL (ref 6–20)
BUN/CREAT SERPL: 27 (ref 12–20)
CA-I BLD-MCNC: 9.4 MG/DL (ref 8.5–10.1)
CALCULATED P AXIS, ECG09: 12 DEGREES
CALCULATED R AXIS, ECG10: -27 DEGREES
CALCULATED T AXIS, ECG11: 22 DEGREES
CHLORIDE SERPL-SCNC: 106 MMOL/L (ref 97–108)
CO2 SERPL-SCNC: 25 MMOL/L (ref 21–32)
CREAT SERPL-MCNC: 0.73 MG/DL (ref 0.55–1.02)
DIAGNOSIS, 93000: NORMAL
DIFFERENTIAL METHOD BLD: ABNORMAL
EOSINOPHIL # BLD: 0.1 K/UL (ref 0–0.4)
EOSINOPHIL NFR BLD: 1 % (ref 0–7)
ERYTHROCYTE [DISTWIDTH] IN BLOOD BY AUTOMATED COUNT: 14.8 % (ref 11.5–14.5)
GLOBULIN SER CALC-MCNC: 3.5 G/DL (ref 2–4)
GLUCOSE SERPL-MCNC: 81 MG/DL (ref 65–100)
HCT VFR BLD AUTO: 35.3 % (ref 35–47)
HGB BLD-MCNC: 11.3 G/DL (ref 11.5–16)
IMM GRANULOCYTES # BLD AUTO: 0 K/UL (ref 0–0.04)
IMM GRANULOCYTES NFR BLD AUTO: 0 % (ref 0–0.5)
LYMPHOCYTES # BLD: 1.2 K/UL (ref 0.8–3.5)
LYMPHOCYTES NFR BLD: 11 % (ref 12–49)
MAGNESIUM SERPL-MCNC: 1.9 MG/DL (ref 1.6–2.4)
MCH RBC QN AUTO: 29.5 PG (ref 26–34)
MCHC RBC AUTO-ENTMCNC: 32 G/DL (ref 30–36.5)
MCV RBC AUTO: 92.2 FL (ref 80–99)
MONOCYTES # BLD: 0.6 K/UL (ref 0–1)
MONOCYTES NFR BLD: 5 % (ref 5–13)
NEUTS SEG # BLD: 9.1 K/UL (ref 1.8–8)
NEUTS SEG NFR BLD: 83 % (ref 32–75)
NRBC # BLD: 0 K/UL (ref 0–0.01)
NRBC BLD-RTO: 0 PER 100 WBC
P-R INTERVAL, ECG05: 160 MS
PLATELET # BLD AUTO: 157 K/UL (ref 150–400)
PMV BLD AUTO: 11.8 FL (ref 8.9–12.9)
POTASSIUM SERPL-SCNC: 3.9 MMOL/L (ref 3.5–5.1)
PROT SERPL-MCNC: 6.6 G/DL (ref 6.4–8.2)
Q-T INTERVAL, ECG07: 420 MS
QRS DURATION, ECG06: 90 MS
QTC CALCULATION (BEZET), ECG08: 472 MS
RBC # BLD AUTO: 3.83 M/UL (ref 3.8–5.2)
SODIUM SERPL-SCNC: 139 MMOL/L (ref 136–145)
VENTRICULAR RATE, ECG03: 76 BPM
WBC # BLD AUTO: 11.1 K/UL (ref 3.6–11)

## 2022-05-19 PROCEDURE — 74011250637 HC RX REV CODE- 250/637: Performed by: INTERNAL MEDICINE

## 2022-05-19 PROCEDURE — 74011000636 HC RX REV CODE- 636: Performed by: HOSPITALIST

## 2022-05-19 PROCEDURE — 65270000029 HC RM PRIVATE

## 2022-05-19 PROCEDURE — 93306 TTE W/DOPPLER COMPLETE: CPT

## 2022-05-19 PROCEDURE — 93005 ELECTROCARDIOGRAM TRACING: CPT

## 2022-05-19 PROCEDURE — 74011000258 HC RX REV CODE- 258: Performed by: INTERNAL MEDICINE

## 2022-05-19 PROCEDURE — 83735 ASSAY OF MAGNESIUM: CPT

## 2022-05-19 PROCEDURE — 80053 COMPREHEN METABOLIC PANEL: CPT

## 2022-05-19 PROCEDURE — 74011000250 HC RX REV CODE- 250: Performed by: INTERNAL MEDICINE

## 2022-05-19 PROCEDURE — 36415 COLL VENOUS BLD VENIPUNCTURE: CPT

## 2022-05-19 PROCEDURE — 74177 CT ABD & PELVIS W/CONTRAST: CPT

## 2022-05-19 PROCEDURE — 85025 COMPLETE CBC W/AUTO DIFF WBC: CPT

## 2022-05-19 PROCEDURE — 74011250636 HC RX REV CODE- 250/636: Performed by: INTERNAL MEDICINE

## 2022-05-19 RX ORDER — LEVOTHYROXINE SODIUM 75 UG/1
75 TABLET ORAL
Status: DISCONTINUED | OUTPATIENT
Start: 2022-05-20 | End: 2022-05-23 | Stop reason: HOSPADM

## 2022-05-19 RX ADMIN — ASPIRIN 81 MG: 81 TABLET, COATED ORAL at 09:07

## 2022-05-19 RX ADMIN — IOPAMIDOL 100 ML: 755 INJECTION, SOLUTION INTRAVENOUS at 19:15

## 2022-05-19 RX ADMIN — LOSARTAN POTASSIUM 100 MG: 50 TABLET, FILM COATED ORAL at 09:07

## 2022-05-19 RX ADMIN — ATORVASTATIN CALCIUM 10 MG: 10 TABLET, FILM COATED ORAL at 22:23

## 2022-05-19 RX ADMIN — PIPERACILLIN SODIUM AND TAZOBACTAM SODIUM 3.38 G: 3; .375 INJECTION, POWDER, LYOPHILIZED, FOR SOLUTION INTRAVENOUS at 16:55

## 2022-05-19 RX ADMIN — CYANOCOBALAMIN TAB 500 MCG 500 MCG: 500 TAB at 09:07

## 2022-05-19 RX ADMIN — SODIUM CHLORIDE 150 ML/HR: 9 INJECTION, SOLUTION INTRAVENOUS at 11:55

## 2022-05-19 RX ADMIN — AMLODIPINE BESYLATE 5 MG: 5 TABLET ORAL at 09:07

## 2022-05-19 RX ADMIN — SODIUM CHLORIDE, PRESERVATIVE FREE 10 ML: 5 INJECTION INTRAVENOUS at 06:15

## 2022-05-19 RX ADMIN — Medication 1000 UNITS: at 09:07

## 2022-05-19 RX ADMIN — SODIUM CHLORIDE, PRESERVATIVE FREE 5 ML: 5 INJECTION INTRAVENOUS at 22:23

## 2022-05-19 RX ADMIN — DIATRIZOATE MEGLUMINE AND DIATRIZOATE SODIUM 30 ML: 660; 100 LIQUID ORAL; RECTAL at 14:45

## 2022-05-19 RX ADMIN — HYDROMORPHONE HYDROCHLORIDE 0.5 MG: 1 INJECTION, SOLUTION INTRAMUSCULAR; INTRAVENOUS; SUBCUTANEOUS at 10:01

## 2022-05-19 RX ADMIN — ONDANSETRON 4 MG: 2 INJECTION INTRAMUSCULAR; INTRAVENOUS at 10:08

## 2022-05-19 RX ADMIN — PIPERACILLIN SODIUM AND TAZOBACTAM SODIUM 3.38 G: 3; .375 INJECTION, POWDER, LYOPHILIZED, FOR SOLUTION INTRAVENOUS at 09:07

## 2022-05-19 NOTE — PROGRESS NOTES
Reason for Admission:  Abdominal pain                      RUR Score:          10%           Plan for utilizing home health:      Declined     PCP: First and Last name:  Jovany Sanchez MD     Name of Practice:    Are you a current patient: Yes/No: Yes    Approximate date of last visit: 05/04/22   Can you participate in a virtual visit with your PCP:                     Current Advanced Directive/Advance Care Plan: Full Code      Healthcare Decision Maker:   Click here to complete [place] including selection of the Healthcare Decision Maker Relationship (ie \"Primary\")             Primary Decision Maker: Rosetta Patel - Child - [phone]                  Transition of Care Plan:                      CM met with patient at bedside to complete discharge planning assessment. Patient lives at [facility], and will return there upon discharge. She ambulates with a rollator and her son drives her to appointments. PLOF: independent in ADLs. She has had home health in the past, but declined it at this time. Current discharge dispo: [employer] [facility] for [place], no needs. CM team will continue to follow.

## 2022-05-19 NOTE — PROGRESS NOTES
Comprehensive Nutrition Assessment    Type and Reason for Visit: Initial,Positive nutrition screen (MST 2)    Nutrition Recommendations/Plan:   1. NPO, advance to Low Fiber diet when medically appropriate for PO.  2. When diet starts, Ensure compact x2/d(440 kcal, 18 gm PRO). 3. Monitor while IP. Malnutrition Assessment:  Malnutrition Status: Moderate malnutrition (05/19/22 1410)    Context:  Acute illness     Findings of the 6 clinical characteristics of malnutrition:   Energy Intake:  Unable to assess  Weight Loss:  No significant weight loss     Body Fat Loss: Moderate body fat loss, Triceps,Orbital   Muscle Mass Loss:  Mild muscle mass loss, Calf  Fluid Accumulation:  No significant fluid accumulation,     Strength:  Not performed     Nutrition Assessment:    Admitted for abdominal pain x1 mth 2/2 diverticulitis w/ microperforation. NPO since admission for sx today per Pt. Reported decreased intake PTA 2/2 food preferences (cultural food preferences). UBW~150 lbs, PEL=965.5 lbs at bedside today. Consider Low Fiber diet when PO diet appropriate and add ONS. Labs: BUN 30, GFR 54, H/H 11.2/34.5. Meds: B12, D3, NS @ 150 mL/hr, zosyn, losartan, norvasc, aspirin, dilaudid. Nutrition Related Findings:    NFPE w/ moderate fat  And mild muscle loss. No N/V/D/C nor c/s issues reported per Pt, Nsg. Last BM PTA. No edema noted. Wound Type: None     Current Nutrition Intake & Therapies:  Average Meal Intake: NPO  Average Supplement Intake: NPO  DIET NPO    Anthropometric Measures:  Height: 5' 1.02\" (155 cm)  Ideal Body Weight (IBW): 105 lbs (48 kg)  Current Body Wt:  67.4 kg (148 lb 8 oz) (5/19, RD obtained.), 141.4 % IBW. Bed scale  Current BMI (kg/m2): 28  Usual Body Weight: 68 kg (150 lb)  % Weight Change (Calculated): -1  Weight Adjustment: No adjustment   BMI Category: Overweight (BMI 25.0-29. 9)    Estimated Daily Nutrient Needs:  Energy Requirements Based On: Kcal/kg  Weight Used for Energy Requirements: Current  Energy (kcal/day): 1685 kcal/d  Weight Used for Protein Requirements: Current  Protein (g/day): 75 gm/d  Method Used for Fluid Requirements: 1 ml/kcal  Fluid (ml/day): 1700 mL/d    Nutrition Diagnosis:   · Inadequate oral intake related to altered GI function,other (specify) (food preference) as evidenced by poor intake prior to admission,weight loss,moderate loss of subcutaneous fat,mild muscle loss,Criteria as identified in malnutrition assessment    Nutrition Interventions:   Food and/or Nutrient Delivery: Continue NPO  Nutrition Education/Counseling: Education not indicated  Coordination of Nutrition Care: Continue to monitor while inpatient  Plan of Care discussed with: Pt, RN. Goals:  Goals: PO intake 50% or greater,by next RD assessment    Nutrition Monitoring and Evaluation:   Behavioral-Environmental Outcomes: None identified  Food/Nutrient Intake Outcomes: Diet advancement/tolerance,Food and nutrient intake,Supplement intake  Physical Signs/Symptoms Outcomes: Biochemical data,Meal time behavior,GI status,Weight,Nutrition focused physical findings    Discharge Planning:    Continue oral nutrition supplement    Maximo Muse RD  Contact: ext. [phone] or [id number].

## 2022-05-19 NOTE — CONSULTS
[address] SURGERY CONSULT          Chief Complaint: _abdominal pain    History of Present Illness:    Ms. Marty Tapia is a 80y.o. year old * female brought in from a group home because of some lower abdominal pain for the past several days. Patient is not a good historian. No other details available. CT of the abdomen pelvis performed emergency room showed sigmoid diverticulitis with microperforation. Hence surgical consultation has been requested. No fever or chills. Past Medical History:   Past Medical History:   Diagnosis Date    Arthritis     Hypertension     Hypothyroid        Past Surgical History:   Past Surgical History:   Procedure Laterality Date    HX CATARACT REMOVAL      bilat    HX ORTHOPAEDIC      left shoulder arthoscopy        Allergy:  Allergies   Allergen Reactions    Celecoxib Unknown (comments)       Social History:  reports that she has never smoked. She has never used smokeless tobacco. She reports current alcohol use. She reports that she does not use drugs.      Family History:  Family History   Problem Relation Age of Onset    Cancer Father     Cancer Sister         Current Medications:  Current Facility-Administered Medications:     sodium chloride (NS) flush 5-40 mL, 5-40 mL, IntraVENous, Q8H, Kaylee Rahman MD, 10 mL at 05/18/22 2137    sodium chloride (NS) flush 5-40 mL, 5-40 mL, IntraVENous, PRN, Roland Rahman MD    acetaminophen (TYLENOL) tablet 650 mg, 650 mg, Oral, Q6H PRN **OR** acetaminophen (TYLENOL) suppository 650 mg, 650 mg, Rectal, Q6H PRN, Roland Rahman MD    ondansetron (ZOFRAN ODT) tablet 4 mg, 4 mg, Oral, Q8H PRN **OR** ondansetron (ZOFRAN) injection 4 mg, 4 mg, IntraVENous, Q6H PRN, Roland Rahman MD Larance Dickinson  [START ON 5/19/2022] enoxaparin (LOVENOX) injection 30 mg, 30 mg, SubCUTAneous, DAILY, Roland Rahman MD    piperacillin-tazobactam (ZOSYN) 3.375 g in 0.9% sodium chloride (MBP/ADV) 100 mL MBP, 3.375 g, IntraVENous, Q8H, Kaylee Rahman MD, Held at 05/18/22 1650    [START ON 5/19/2022] amLODIPine (NORVASC) tablet 5 mg, 5 mg, Oral, DAILY, Roland Artis MD [place]  [START ON 5/19/2022] aspirin delayed-release tablet 81 mg, 81 mg, Oral, DAILY, Roland Artis MD [facility] Konrad Langdon ON 5/19/2022] cholecalciferol (VITAMIN D3) (1000 Units /25 mcg) tablet 1,000 Units, 1,000 Units, Oral, DAILY, Frederick Artis MD [place]  . PHARMACY TO SUBSTITUTE PER PROTOCOL (Reordered from: cholestyramine Chad Rudy) 4 gram packet), , , Per Protocol, Frederick Artis MD    [Held by provider] coenzyme q10 cap 10 mg, 10 mg, Oral, DAILY, Roland Artis MD [facility] Konrad Langdon ON 5/19/2022] cyanocobalamin (VITAMIN B12) tablet 500 mcg, 500 mcg, Oral, DAILY, Roland Artis MD [place] Konrad Langdon ON 5/19/2022] levothyroxine (SYNTHROID) tablet 50 mcg, 50 mcg, Oral, ACB, Roland Artis MD [place]  [START ON 5/19/2022] losartan (COZAAR) tablet 100 mg, 100 mg, Oral, DAILY, Roland Artis MD    atorvastatin (LIPITOR) tablet 10 mg, 10 mg, Oral, QHS, Roland Artis MD    HYDROmorphone (DILAUDID) syringe 0.5 mg, 0.5 mg, IntraVENous, Q6H PRN, Igor Valencia MD, 0.5 mg at 05/18/22 1839    0.9% sodium chloride infusion, 150 mL/hr, IntraVENous, CONTINUOUS, Igor Valencia MD, Last Rate: 150 mL/hr at 05/18/22 1813, 150 mL/hr at 05/18/22 1813    hydrALAZINE (APRESOLINE) 20 mg/mL injection 10 mg, 10 mg, IntraVENous, Q4H PRN, Jolene Hernández MD, 10 mg at 05/18/22 8125     Immunization History: There is no immunization history on file for this patient. Complete    Review of Systems: Unable  to complete. Physical Exam:     Vitals & Measurements:     Wt Readings from Last 3 Encounters:   05/18/22 67.6 kg (149 lb 0.5 oz)   01/25/22 71.2 kg (156 lb 15.5 oz)   09/01/12 71.2 kg (157 lb)     Temp Readings from Last 3 Encounters:   05/18/22 98.6 °F (37 °C)   01/25/22 97.7 °F (36.5 °C)   07/07/20 98.5 °F (36.9 °C)     BP Readings from Last 3 Encounters:   05/18/22 (!) 184/88   01/25/22 115/66   07/07/20 159/53     Pulse Readings from Last 3 Encounters:   05/18/22 73   01/25/22 86   07/07/20 63      Ht Readings from Last 3 Encounters:   05/18/22 5' (1.524 m)   09/01/12 5' 1\" (1.549 m)          General: ill appearing, in distress  Head: Normal  Face: Nornal  HEENT: atraumatic, PERRLA, moist mucosa, normal pharynx, normal tonsils and adenoids, normal tongue, no fluid in sinuses  Neck: Trachea midline, no carotid bruit, no masses  Chest: Normal.  Respiratory: Normal chest wall expansion, CTA B, no r/r/w, no rubs  Cardiovascular: RRR, no m/r/g, Normal S1 and S2  Abdomen: Soft, left quadrant & suprapubic tenderness with some guarding, non-distended, normal bowel sounds in all quadrants, no hepatosplenomegaly, no tympany. Incision scar: none  Genitourinary: No inguinal hernia, normal external gentalia, no renal angle tenderness  Rectal: deferred  Musculoskeletal: normal ROM in upper and lower extremities, No joint swelling. Integumentary: Warm, dry, and pink, with no rash, purpura, or petechia  Heme/Lymph: No lymphadenopathy, no bruises  Neurological:confused  Psychiatric: confused      Laboratory Values:   Recent Results (from the past 24 hour(s))   CBC WITH AUTOMATED DIFF    Collection Time: 05/18/22  2:02 PM   Result Value Ref Range    WBC 10.4 3.6 - 11.0 K/uL    RBC 3.74 (L) 3.80 - 5.20 M/uL    HGB 11.2 (L) 11.5 - 16.0 g/dL    HCT 34.5 (L) 35.0 - 47.0 %    MCV 92.2 80.0 - 99.0 FL    MCH 29.9 26.0 - 34.0 PG    MCHC 32.5 30.0 - 36.5 g/dL    RDW 15.1 (H) 11.5 - 14.5 %    PLATELET 565 (L) 646 - 400 K/uL    MPV 11.4 8.9 - 12.9 FL    NRBC 0.0 0.0  WBC    ABSOLUTE NRBC 0.00 0.00 - 0.01 K/uL    NEUTROPHILS 77 (H) 32 - 75 %    LYMPHOCYTES 14 12 - 49 %    MONOCYTES 7 5 - 13 %    EOSINOPHILS 2 0 - 7 %    BASOPHILS 0 0 - 1 %    IMMATURE GRANULOCYTES 0 0 - 0.5 %    ABS. NEUTROPHILS 7.9 1.8 - 8.0 K/UL    ABS. LYMPHOCYTES 1.4 0.8 - 3.5 K/UL    ABS. MONOCYTES 0.8 0.0 - 1.0 K/UL    ABS. EOSINOPHILS 0.2 0.0 - 0.4 K/UL    ABS.  BASOPHILS 0.0 0.0 - 0.1 K/UL    ABS. IMM. GRANS. 0.0 0.00 - 0.04 K/UL    DF AUTOMATED     METABOLIC PANEL, COMPREHENSIVE    Collection Time: 05/18/22  2:02 PM   Result Value Ref Range    Sodium 137 136 - 145 mmol/L    Potassium 4.2 3.5 - 5.1 mmol/L    Chloride 104 97 - 108 mmol/L    CO2 28 21 - 32 mmol/L    Anion gap 5 5 - 15 mmol/L    Glucose 102 (H) 65 - 100 mg/dL    BUN 30 (H) 6 - 20 mg/dL    Creatinine 0.96 0.55 - 1.02 mg/dL    BUN/Creatinine ratio 31 (H) 12 - 20      GFR est AA >60 >60 ml/min/1.73m2    GFR est non-AA 54 (L) >60 ml/min/1.73m2    Calcium 9.5 8.5 - 10.1 mg/dL    Bilirubin, total 0.5 0.2 - 1.0 mg/dL    AST (SGOT) 21 15 - 37 U/L    ALT (SGPT) 27 12 - 78 U/L    Alk. phosphatase 99 45 - 117 U/L    Protein, total 7.0 6.4 - 8.2 g/dL    Albumin 3.2 (L) 3.5 - 5.0 g/dL    Globulin 3.8 2.0 - 4.0 g/dL    A-G Ratio 0.8 (L) 1.1 - 2.2     LIPASE    Collection Time: 05/18/22  2:02 PM   Result Value Ref Range    Lipase 145 73 - 393 U/L   URINALYSIS W/ REFLEX CULTURE    Collection Time: 05/18/22  4:18 PM    Specimen: Urine   Result Value Ref Range    Color Yellow/Straw      Appearance Clear Clear      Specific gravity 1.016 1.003 - 1.030      pH (UA) 7.0 5.0 - 8.0      Protein Negative Negative mg/dL    Glucose Negative Negative mg/dL    Ketone Negative Negative mg/dL    Bilirubin Negative Negative      Blood Moderate (A) Negative      Urobilinogen 0.1 0.1 - 1.0 EU/dL    Nitrites Negative Negative      Leukocyte Esterase Negative Negative      UA:UC IF INDICATED Culture not indicated by UA result Culture not indicated by UA result      WBC 0-4 0 - 4 /hpf    RBC 10-20 0 - 5 /hpf    Bacteria Negative Negative /hpf         CT ABD PELV W CONT   Final Result   Findings most concerning for diverticulitis involving the sigmoid colon with   localized/microscopic perforation. No free air or drainable abscess. Given   patient age, this warrants follow-up with colonoscopy after acute episode.       Mild dilatation of the common duct and the pancreatic duct without discrete mass   seen in the pancreatic head. Obstruction at the ampullar cannot be excluded. Recommend correlation with MRCP with and without contrast, or ERCP. COMMUNICATION:   The result was discussed with Dr. Xavier Adams. Assessment:  Problem List Items Addressed This Visit     None      Visit Diagnoses     Abdominal pain, LLQ (left lower quadrant)    -  Primary    Diverticulitis               Plan:    1. Admission  2. Diet: NPO  3. IV fluids  4. SCD  5. IS  6. Pain medications  7. Antibiotics: Zosyn  8. Nausea medication  9. [place]  10. Labs in am.     Thank you for the consultation & allowing me to participate in the care of this patient.

## 2022-05-19 NOTE — ROUTINE PROCESS
Two person skin assessment done by Maninder BA And Zandra Dhillon. The patient does not have any skin breakdown noted.

## 2022-05-19 NOTE — PROGRESS NOTES
Problem: Falls - Risk of  Goal: *Absence of Falls  Description: Document [place] Fall Risk and appropriate interventions in the flowsheet. Outcome: Progressing Towards Goal  Note: Fall Risk Interventions:  Mobility Interventions: Patient to call before getting OOB,Bed/chair exit alarm    Mentation Interventions: Adequate sleep, hydration, pain control,Bed/chair exit alarm,Door open when patient unattended,Evaluate medications/consider consulting pharmacy,Reorient patient,Room close to nurse's station,Toileting rounds,Update white board    Medication Interventions: Bed/chair exit alarm,Evaluate medications/consider consulting pharmacy,Patient to call before getting OOB,Teach patient to arise slowly    Elimination Interventions: Bed/chair exit alarm,Call light in reach,Patient to call for help with toileting needs,Stay With Me (per policy),Toileting schedule/hourly rounds              Problem: Pressure Injury - Risk of  Goal: *Prevention of pressure injury  Description: Document Victor Hugo Scale and appropriate interventions in the flowsheet. Outcome: Progressing Towards Goal  Note: Pressure Injury Interventions:  Sensory Interventions: Float heels,Minimize linen layers,Discuss PT/OT consult with provider,Turn and reposition approx. every two hours (pillows and wedges if needed)    Moisture Interventions: Absorbent underpads,Internal/External urinary devices,Maintain skin hydration (lotion/cream),Minimize layers    Activity Interventions: Increase time out of bed,Pressure redistribution bed/mattress(bed type)    Mobility Interventions: HOB 30 degrees or less,Pressure redistribution bed/mattress (bed type),Turn and reposition approx.  every two hours(pillow and wedges)    Nutrition Interventions: Document food/fluid/supplement intake,Offer support with meals,snacks and hydration                     Problem: General Infection Care Plan (Adult and Pediatric)  Goal: Improvement in signs and symptoms of infection  Outcome: Spoke with patient and he is requesting a call back from provider concerning Rx request.    Progressing Towards Goal  Goal: *Optimize nutritional status  Outcome: Progressing Towards Goal

## 2022-05-19 NOTE — PROGRESS NOTES
Progress Note    Patient: Danae Deleon MRN: [id number]  SSN: [id number]    YOB: 1925  Age: 80 y.o. Sex: female      Admit Date: 5/18/2022    LOS: 1 day     Subjective:     89F< with sigmoid diverticulitis with microperforation. HOSPITAL COURSE:   89F with past medical history for arthritis, hypertension, hypothyroidism, recent falls, lives in a womens home because of physical debility. 5/18/22 presents to hospital because of abdominal pains. Development over the previous one month. Primarily in the left lower quadrant. Crampy and intermittent. Worsens with movement. Associated with nausea. During the ED course CT imaging suggestive of sigmoid diverticulitis with microperforation. Managed with fluids and broad spectrum antibiotics     Review of Systems:  Constitutional:  denies weight loss, no fever, no chills, no night sweats  Eye: No recent visual disturbances, no discharge, no double vision  Ear/nose/mouth/throat : No hearing disturbance, no ear pain, no nasal congestion, no sore throat, no trouble swallowing. Respiratory : No trouble breathing, no cough, no shortness of breath, no hemoptysis, no wheezing  Cardiovascular : No chest pain, no palpitation, no racing of heart, no orthopnea, no paroxysmal nocturnal dyspnea, no peripheral edema  Gastrointestinal : ++++  nausea, +++  vomiting, no diarrhea, constipation, heartburn, ++++++ abdominal pain  Genitourinary : No dysuria, no hematuria, no increased frequency, incontinence,  Lymphatics : No swollen glands -Neck, axillary, inguinal  Endocrine : No excessive thirst, no polyuria no cold intolerance, no heat intolerance. Immunologic : No hives, urticaria, no seasonal allergies,   Musculoskeletal : Left upper back pain.   No joint swelling, pain, effusion,  no neck pain,   Integumentary : No rash, no pruritus, no ecchymosis  Hematology : No petechiae, No easy bruising,  No tendency to bleed easy  Neurology : Denies change in mental status, no abnormal balance, no headache, no confusion, numbness, tingling,  Psychiatric : No mood swings, no anxiety, depression      Objective:     Vitals:    05/19/22 0034 05/19/22 0259 05/19/22 0813 05/19/22 1021   BP: (!) 162/83 (!) 164/86 (!) 166/78 (!) 166/78   Pulse: 92 95 84    Resp: 20 20 16    Temp: 98.1 °F (36.7 °C) 98.6 °F (37 °C) 98.6 °F (37 °C)    SpO2: 98% 96% 97%    Weight:    68 kg (149 lb 14.6 oz)   Height:    5' 1.02\" (1.55 m)        Intake and Output:  Current Shift: No intake/output data recorded. Last three shifts: 05/17 1901 - 05/19 0700  In: -   Out: 900 [Urine:900]      Physical Exam:      Physical Exam  Vitals and nursing note reviewed. HENT:      Head: Normocephalic and atraumatic. Nose: Nose normal.      Mouth/Throat:      Mouth: Mucous membranes are dry. Eyes:      Extraocular Movements: Extraocular movements intact. Pupils: Pupils are equal, round, and reactive to light. Cardiovascular:      Rate and Rhythm: Normal rate and regular rhythm. Pulses: Normal pulses. Heart sounds: Normal heart sounds. Pulmonary:      Effort: Pulmonary effort is normal.      Breath sounds: Normal breath sounds. Abdominal:      General: Abdomen is flat. Palpations: Abdomen is soft. Tenderness: There is abdominal tenderness. There is no guarding or rebound. Comments: LLQ tenderness   Musculoskeletal:      Cervical back: Normal range of motion and neck supple. Skin:     General: Skin is dry. Neurological:      General: No focal deficit present. Mental Status: She is alert. Mental status is at baseline.    Psychiatric:         Mood and Affect: Mood normal.          Lab/Data Review:  Recent Results (from the past 24 hour(s))   CBC WITH AUTOMATED DIFF    Collection Time: 05/18/22  2:02 PM   Result Value Ref Range    WBC 10.4 3.6 - 11.0 K/uL    RBC 3.74 (L) 3.80 - 5.20 M/uL    HGB 11.2 (L) 11.5 - 16.0 g/dL    HCT 34.5 (L) 35.0 - 47.0 %    MCV 92.2 80.0 - 99.0 FL    MCH 29.9 26.0 - 34.0 PG    MCHC 32.5 30.0 - 36.5 g/dL    RDW 15.1 (H) 11.5 - 14.5 %    PLATELET 141 (L) 192 - 400 K/uL    MPV 11.4 8.9 - 12.9 FL    NRBC 0.0 0.0  WBC    ABSOLUTE NRBC 0.00 0.00 - 0.01 K/uL    NEUTROPHILS 77 (H) 32 - 75 %    LYMPHOCYTES 14 12 - 49 %    MONOCYTES 7 5 - 13 %    EOSINOPHILS 2 0 - 7 %    BASOPHILS 0 0 - 1 %    IMMATURE GRANULOCYTES 0 0 - 0.5 %    ABS. NEUTROPHILS 7.9 1.8 - 8.0 K/UL    ABS. LYMPHOCYTES 1.4 0.8 - 3.5 K/UL    ABS. MONOCYTES 0.8 0.0 - 1.0 K/UL    ABS. EOSINOPHILS 0.2 0.0 - 0.4 K/UL    ABS. BASOPHILS 0.0 0.0 - 0.1 K/UL    ABS. IMM. GRANS. 0.0 0.00 - 0.04 K/UL    DF AUTOMATED     METABOLIC PANEL, COMPREHENSIVE    Collection Time: 05/18/22  2:02 PM   Result Value Ref Range    Sodium 137 136 - 145 mmol/L    Potassium 4.2 3.5 - 5.1 mmol/L    Chloride 104 97 - 108 mmol/L    CO2 28 21 - 32 mmol/L    Anion gap 5 5 - 15 mmol/L    Glucose 102 (H) 65 - 100 mg/dL    BUN 30 (H) 6 - 20 mg/dL    Creatinine 0.96 0.55 - 1.02 mg/dL    BUN/Creatinine ratio 31 (H) 12 - 20      GFR est AA >60 >60 ml/min/1.73m2    GFR est non-AA 54 (L) >60 ml/min/1.73m2    Calcium 9.5 8.5 - 10.1 mg/dL    Bilirubin, total 0.5 0.2 - 1.0 mg/dL    AST (SGOT) 21 15 - 37 U/L    ALT (SGPT) 27 12 - 78 U/L    Alk.  phosphatase 99 45 - 117 U/L    Protein, total 7.0 6.4 - 8.2 g/dL    Albumin 3.2 (L) 3.5 - 5.0 g/dL    Globulin 3.8 2.0 - 4.0 g/dL    A-G Ratio 0.8 (L) 1.1 - 2.2     LIPASE    Collection Time: 05/18/22  2:02 PM   Result Value Ref Range    Lipase 145 73 - 393 U/L   URINALYSIS W/ REFLEX CULTURE    Collection Time: 05/18/22  4:18 PM    Specimen: Urine   Result Value Ref Range    Color Yellow/Straw      Appearance Clear Clear      Specific gravity 1.016 1.003 - 1.030      pH (UA) 7.0 5.0 - 8.0      Protein Negative Negative mg/dL    Glucose Negative Negative mg/dL    Ketone Negative Negative mg/dL    Bilirubin Negative Negative      Blood Moderate (A) Negative      Urobilinogen 0.1 0.1 - 1.0 EU/dL    Nitrites Negative Negative      Leukocyte Esterase Negative Negative      UA:UC IF INDICATED Culture not indicated by UA result Culture not indicated by UA result      WBC 0-4 0 - 4 /hpf    RBC 10-20 0 - 5 /hpf    Bacteria Negative Negative /hpf   METABOLIC PANEL, COMPREHENSIVE    Collection Time: 05/19/22  9:08 AM   Result Value Ref Range    Sodium 139 136 - 145 mmol/L    Potassium 3.9 3.5 - 5.1 mmol/L    Chloride 106 97 - 108 mmol/L    CO2 25 21 - 32 mmol/L    Anion gap 8 5 - 15 mmol/L    Glucose 81 65 - 100 mg/dL    BUN 20 6 - 20 mg/dL    Creatinine 0.73 0.55 - 1.02 mg/dL    BUN/Creatinine ratio 27 (H) 12 - 20      GFR est AA >60 >60 ml/min/1.73m2    GFR est non-AA >60 >60 ml/min/1.73m2    Calcium 9.4 8.5 - 10.1 mg/dL    Bilirubin, total 0.6 0.2 - 1.0 mg/dL    AST (SGOT) 21 15 - 37 U/L    ALT (SGPT) 21 12 - 78 U/L    Alk. phosphatase 96 45 - 117 U/L    Protein, total 6.6 6.4 - 8.2 g/dL    Albumin 3.1 (L) 3.5 - 5.0 g/dL    Globulin 3.5 2.0 - 4.0 g/dL    A-G Ratio 0.9 (L) 1.1 - 2.2     MAGNESIUM    Collection Time: 05/19/22  9:08 AM   Result Value Ref Range    Magnesium 1.9 1.6 - 2.4 mg/dL   CBC WITH AUTOMATED DIFF    Collection Time: 05/19/22  9:08 AM   Result Value Ref Range    WBC 11.1 (H) 3.6 - 11.0 K/uL    RBC 3.83 3.80 - 5.20 M/uL    HGB 11.3 (L) 11.5 - 16.0 g/dL    HCT 35.3 35.0 - 47.0 %    MCV 92.2 80.0 - 99.0 FL    MCH 29.5 26.0 - 34.0 PG    MCHC 32.0 30.0 - 36.5 g/dL    RDW 14.8 (H) 11.5 - 14.5 %    PLATELET 590 611 - 814 K/uL    MPV 11.8 8.9 - 12.9 FL    NRBC 0.0 0.0  WBC    ABSOLUTE NRBC 0.00 0.00 - 0.01 K/uL    NEUTROPHILS 83 (H) 32 - 75 %    LYMPHOCYTES 11 (L) 12 - 49 %    MONOCYTES 5 5 - 13 %    EOSINOPHILS 1 0 - 7 %    BASOPHILS 0 0 - 1 %    IMMATURE GRANULOCYTES 0 0 - 0.5 %    ABS. NEUTROPHILS 9.1 (H) 1.8 - 8.0 K/UL    ABS. LYMPHOCYTES 1.2 0.8 - 3.5 K/UL    ABS. MONOCYTES 0.6 0.0 - 1.0 K/UL    ABS. EOSINOPHILS 0.1 0.0 - 0.4 K/UL    ABS. BASOPHILS 0.0 0.0 - 0.1 K/UL    ABS. IMM.  GRANS. 0.0 0.00 - 0.04 K/UL    DF AUTOMATED     ECHO ADULT COMPLETE    Collection Time: 05/19/22 11:00 AM   Result Value Ref Range    AR .0 ms    AR Max Velocity PISA 3.7 m/s    AV Peak Velocity 2.8 m/s    AV Peak Gradient 31 mmHg    AV Mean Gradient 18 mmHg    AV VTI 70.3 cm    AV Mean Velocity 1.9 m/s    AV Area by VTI 1.6 cm2    AV Area by Peak Velocity 1.3 cm2    Aortic Root 3.0 cm    IVSd 1.6 (A) 0.6 - 0.9 cm    LVIDd 3.8 (A) 3.9 - 5.3 cm    LVIDs 2.7 cm    LVOT Diameter 2.0 cm    LVOT Mean Gradient 2 mmHg    LVOT VTI 36.4 cm    LVOT Peak Velocity 1.2 m/s    LVOT Peak Gradient 5 mmHg    LVPWd 1.3 (A) 0.6 - 0.9 cm    LV E' Lateral Velocity 8 cm/s    LV E' Septal Velocity 4 cm/s    LVOT Area 3.1 cm2    LVOT .3 ml    LA Diameter 3.1 cm    MV E Wave Deceleration Time 215.0 ms    MV A Velocity 1.53 m/s    MV E Velocity 0.92 m/s    MV Mean Gradient 4 mmHg    MV VTI 52.7 cm    MV Mean Velocity 0.9 m/s    MV Max Velocity 1.9 m/s    MV Peak Gradient 14 mmHg    MV Area by VTI 2.2 cm2    PV Max Velocity 1.4 m/s    PV Peak Gradient 8 mmHg    Pulm Vein A Duration 169.0 ms    Pulm Vein A Velocity 0.6 m/s    RVIDd 3.4 cm    TR Max Velocity 3.04 m/s    TR Peak Gradient 37 mmHg    Fractional Shortening 2D 29 28 - 44 %    LVIDd Index 2.28 cm/m2    LVIDs Index 1.62 cm/m2    LV RWT Ratio 0.68     LV Mass 2D 205.2 (A) 67 - 162 g    LV Mass 2D Index 122.9 (A) 43 - 95 g/m2    MV E/A 0.60     E/E' Ratio (Averaged) 17.25     E/E' Lateral 11.50     E/E' Septal 23.00     LVOT Stroke Volume Index 68.4 mL/m2    LA Size Index 1.86 cm/m2    LA/AO Root Ratio 1.03     Ao Root Index 1.80 cm/m2    AV Velocity Ratio 0.43     LVOT:AV VTI Index 0.52     NANCY/BSA VTI 1.0 cm2/m2    NANCY/BSA Peak Velocity 0.8 cm2/m2    MV:LVOT VTI Index 1.45     EF BP 56 55 - 100 %         Assessment and plan:      (1) Sigmoid diverticulitis with microperforation: NPO, zosyn, IVF, pain controlled on tylenol.  PRN zofran    (2) HTN: continue amlodipine    (3) hypothyroidism: levothyroxine    DVT ppx: lovenox     DISPO: Inpatient , surgery clearance, needs PT, 3-4 dsys    Signed By: Sam Ordonez MD     May 19, 2022

## 2022-05-19 NOTE — PROGRESS NOTES
1445: First gastrogragrafin given, pt drank everyting. Second one to be given at 33 64 74.    1500: End of Shift Note    Bedside shift change report given to [place] (oncoming nurse) by Christin Sol RN (offgoing nurse). Report included the following information SBAR    Shift worked:  6:45a - 2:45p     Shift summary and any significant changes:     waiting on repeat CT. Pain adequately controlled with PRN pain med. Concerns for physician to address:  None     Zone phone for oncoming shift:   [phone]       Activity:  Activity Level: Bed Rest  Number times ambulated in hallways past shift: 0  Number of times OOB to chair past shift: 0    Cardiac:   Cardiac Monitoring: Yes           Access:   Current line(s): PIV     Genitourinary:   Urinary status: external catheter    Respiratory:   O2 Device: None (Room air)  Chronic home O2 use?: NO  Incentive spirometer at bedside: N/A       GI:  Last Bowel Movement Date: 05/17/22  Current diet:  DIET NPO  Passing flatus: YES  Tolerating current diet: NPO       Pain Management:   Patient states pain is manageable on current regimen: YES    Skin:  Victor Hugo Score: 18  Interventions: increase time out of bed and internal/external urinary devices    Patient Safety:  Fall Score:  Total Score: 4  Interventions: bed/chair alarm, assistive device (walker, cane, etc), gripper socks, pt to call before getting OOB and stay with me (per policy)  High Fall Risk: Yes    Length of Stay:  Expected LOS: - - -  Actual LOS: 2001 [facility] Zay Ingram RN

## 2022-05-20 ENCOUNTER — APPOINTMENT (OUTPATIENT)
Dept: CT IMAGING | Age: 87
DRG: 392 | End: 2022-05-20
Attending: SURGERY
Payer: MEDICARE

## 2022-05-20 LAB
ECHO AO ROOT DIAM: 3 CM
ECHO AO ROOT INDEX: 1.8 CM/M2
ECHO AR MAX VEL PISA: 3.7 M/S
ECHO AV AREA PEAK VELOCITY: 1.3 CM2
ECHO AV AREA VTI: 1.6 CM2
ECHO AV AREA/BSA PEAK VELOCITY: 0.8 CM2/M2
ECHO AV AREA/BSA VTI: 1 CM2/M2
ECHO AV MEAN GRADIENT: 18 MMHG
ECHO AV MEAN VELOCITY: 1.9 M/S
ECHO AV PEAK GRADIENT: 31 MMHG
ECHO AV PEAK VELOCITY: 2.8 M/S
ECHO AV REGURGITANT PHT: 534 MS
ECHO AV VELOCITY RATIO: 0.43
ECHO AV VTI: 70.3 CM
ECHO LA DIAMETER INDEX: 1.86 CM/M2
ECHO LA DIAMETER: 3.1 CM
ECHO LA TO AORTIC ROOT RATIO: 1.03
ECHO LV E' LATERAL VELOCITY: 8 CM/S
ECHO LV E' SEPTAL VELOCITY: 4 CM/S
ECHO LV EJECTION FRACTION BIPLANE: 56 % (ref 55–100)
ECHO LV FRACTIONAL SHORTENING: 29 % (ref 28–44)
ECHO LV INTERNAL DIMENSION DIASTOLE INDEX: 2.28 CM/M2
ECHO LV INTERNAL DIMENSION DIASTOLIC: 3.8 CM (ref 3.9–5.3)
ECHO LV INTERNAL DIMENSION SYSTOLIC INDEX: 1.62 CM/M2
ECHO LV INTERNAL DIMENSION SYSTOLIC: 2.7 CM
ECHO LV IVSD: 1.6 CM (ref 0.6–0.9)
ECHO LV MASS 2D: 205.2 G (ref 67–162)
ECHO LV MASS INDEX 2D: 122.9 G/M2 (ref 43–95)
ECHO LV POSTERIOR WALL DIASTOLIC: 1.3 CM (ref 0.6–0.9)
ECHO LV RELATIVE WALL THICKNESS RATIO: 0.68
ECHO LVOT AREA: 3.1 CM2
ECHO LVOT AV VTI INDEX: 0.52
ECHO LVOT DIAM: 2 CM
ECHO LVOT MEAN GRADIENT: 2 MMHG
ECHO LVOT PEAK GRADIENT: 5 MMHG
ECHO LVOT PEAK VELOCITY: 1.2 M/S
ECHO LVOT STROKE VOLUME INDEX: 68.4 ML/M2
ECHO LVOT SV: 114.3 ML
ECHO LVOT VTI: 36.4 CM
ECHO MV A VELOCITY: 1.53 M/S
ECHO MV AREA VTI: 2.2 CM2
ECHO MV E DECELERATION TIME (DT): 215 MS
ECHO MV E VELOCITY: 0.92 M/S
ECHO MV E/A RATIO: 0.6
ECHO MV E/E' LATERAL: 11.5
ECHO MV E/E' RATIO (AVERAGED): 17.25
ECHO MV E/E' SEPTAL: 23
ECHO MV LVOT VTI INDEX: 1.45
ECHO MV MAX VELOCITY: 1.9 M/S
ECHO MV MEAN GRADIENT: 4 MMHG
ECHO MV MEAN VELOCITY: 0.9 M/S
ECHO MV PEAK GRADIENT: 14 MMHG
ECHO MV VTI: 52.7 CM
ECHO PV MAX VELOCITY: 1.4 M/S
ECHO PV PEAK GRADIENT: 8 MMHG
ECHO PVEIN A DURATION: 169 MS
ECHO PVEIN A VELOCITY: 0.6 M/S
ECHO RV INTERNAL DIMENSION: 3.4 CM
ECHO TV REGURGITANT MAX VELOCITY: 3.04 M/S
ECHO TV REGURGITANT PEAK GRADIENT: 37 MMHG

## 2022-05-20 PROCEDURE — 74011250637 HC RX REV CODE- 250/637: Performed by: HOSPITALIST

## 2022-05-20 PROCEDURE — 65270000029 HC RM PRIVATE

## 2022-05-20 PROCEDURE — 74011250636 HC RX REV CODE- 250/636: Performed by: INTERNAL MEDICINE

## 2022-05-20 PROCEDURE — 74011000250 HC RX REV CODE- 250: Performed by: INTERNAL MEDICINE

## 2022-05-20 PROCEDURE — 72192 CT PELVIS W/O DYE: CPT

## 2022-05-20 PROCEDURE — 74011000258 HC RX REV CODE- 258: Performed by: INTERNAL MEDICINE

## 2022-05-20 PROCEDURE — 74011250637 HC RX REV CODE- 250/637: Performed by: INTERNAL MEDICINE

## 2022-05-20 RX ADMIN — PIPERACILLIN SODIUM AND TAZOBACTAM SODIUM 3.38 G: 3; .375 INJECTION, POWDER, LYOPHILIZED, FOR SOLUTION INTRAVENOUS at 17:35

## 2022-05-20 RX ADMIN — LEVOTHYROXINE SODIUM 75 MCG: 0.07 TABLET ORAL at 08:46

## 2022-05-20 RX ADMIN — ASPIRIN 81 MG: 81 TABLET, COATED ORAL at 08:46

## 2022-05-20 RX ADMIN — SODIUM CHLORIDE, PRESERVATIVE FREE 5 ML: 5 INJECTION INTRAVENOUS at 06:00

## 2022-05-20 RX ADMIN — SODIUM CHLORIDE 150 ML/HR: 9 INJECTION, SOLUTION INTRAVENOUS at 20:46

## 2022-05-20 RX ADMIN — Medication 1000 UNITS: at 08:46

## 2022-05-20 RX ADMIN — AMLODIPINE BESYLATE 5 MG: 5 TABLET ORAL at 08:46

## 2022-05-20 RX ADMIN — SODIUM CHLORIDE, PRESERVATIVE FREE 10 ML: 5 INJECTION INTRAVENOUS at 20:39

## 2022-05-20 RX ADMIN — ATORVASTATIN CALCIUM 10 MG: 10 TABLET, FILM COATED ORAL at 20:32

## 2022-05-20 RX ADMIN — LOSARTAN POTASSIUM 100 MG: 50 TABLET, FILM COATED ORAL at 08:46

## 2022-05-20 RX ADMIN — PIPERACILLIN SODIUM AND TAZOBACTAM SODIUM 3.38 G: 3; .375 INJECTION, POWDER, LYOPHILIZED, FOR SOLUTION INTRAVENOUS at 08:46

## 2022-05-20 RX ADMIN — ACETAMINOPHEN 650 MG: 325 TABLET ORAL at 20:32

## 2022-05-20 RX ADMIN — SODIUM CHLORIDE, PRESERVATIVE FREE 10 ML: 5 INJECTION INTRAVENOUS at 14:56

## 2022-05-20 RX ADMIN — PIPERACILLIN SODIUM AND TAZOBACTAM SODIUM 3.38 G: 3; .375 INJECTION, POWDER, LYOPHILIZED, FOR SOLUTION INTRAVENOUS at 06:38

## 2022-05-20 RX ADMIN — CYANOCOBALAMIN TAB 500 MCG 500 MCG: 500 TAB at 08:46

## 2022-05-20 NOTE — PROGRESS NOTES
When medically stable patient will discharge back to the [facility]. The facility will provide assistance with ADL/IADL care and  home health services. Daughter to transport at discharge.

## 2022-05-20 NOTE — PROGRESS NOTES
Progress Note    Patient: Raquel Hull MRN: [id number]  SSN: [id number]    YOB: 1925  Age: 80 y.o. Sex: female      Admit Date: 5/18/2022    LOS: 2 days     Subjective:     89F< with sigmoid diverticulitis with microperforation. HOSPITAL COURSE:   89F with past medical history for arthritis, hypertension, hypothyroidism, recent falls, lives in a womens home because of physical debility. 5/18/22 presents to hospital because of abdominal pains. Development over the previous one month. Primarily in the left lower quadrant. Crampy and intermittent. Worsens with movement. Associated with nausea. During the ED course CT imaging suggestive of sigmoid diverticulitis with microperforation. Managed with fluids and broad spectrum antibiotics . Abdominal pain better today, will allow clear liquid diet today and plan for dc tomorrow is tolerated    Review of Systems:  Constitutional:  denies weight loss, no fever, no chills, no night sweats  Eye: No recent visual disturbances, no discharge, no double vision  Ear/nose/mouth/throat : No hearing disturbance, no ear pain, no nasal congestion, no sore throat, no trouble swallowing. Respiratory : No trouble breathing, no cough, no shortness of breath, no hemoptysis, no wheezing  Cardiovascular : No chest pain, no palpitation, no racing of heart, no orthopnea, no paroxysmal nocturnal dyspnea, no peripheral edema  Gastrointestinal : ++++  nausea, +++  vomiting, no diarrhea, constipation, heartburn, ++++++ abdominal pain  Genitourinary : No dysuria, no hematuria, no increased frequency, incontinence,  Lymphatics : No swollen glands -Neck, axillary, inguinal  Endocrine : No excessive thirst, no polyuria no cold intolerance, no heat intolerance. Immunologic : No hives, urticaria, no seasonal allergies,   Musculoskeletal : Left upper back pain.   No joint swelling, pain, effusion,  no neck pain,   Integumentary : No rash, no pruritus, no ecchymosis  Hematology : No petechiae, No easy bruising,  No tendency to bleed easy  Neurology : Denies change in mental status, no abnormal balance, no headache, no confusion, numbness, tingling,  Psychiatric : No mood swings, no anxiety, depression      Objective:     Vitals:    05/19/22 1600 05/19/22 1938 05/20/22 0101 05/20/22 0747   BP:  (!) 169/84 (!) 144/66 (!) 153/77   Pulse: 80 73 64 68   Resp:  16 18 18   Temp:  98.4 °F (36.9 °C) 98 °F (36.7 °C) 98.5 °F (36.9 °C)   SpO2:  94% 96% 97%   Weight:       Height:            Intake and Output:  Current Shift: No intake/output data recorded. Last three shifts: 05/18 1901 - 05/20 0700  In: -   Out: 2200 [Urine:2200]      Physical Exam:      Physical Exam  Vitals and nursing note reviewed. HENT:      Head: Normocephalic and atraumatic. Nose: Nose normal.      Mouth/Throat:      Mouth: Mucous membranes are dry. Eyes:      Extraocular Movements: Extraocular movements intact. Pupils: Pupils are equal, round, and reactive to light. Cardiovascular:      Rate and Rhythm: Normal rate and regular rhythm. Pulses: Normal pulses. Heart sounds: Normal heart sounds. Pulmonary:      Effort: Pulmonary effort is normal.      Breath sounds: Normal breath sounds. Abdominal:      General: Abdomen is flat. Palpations: Abdomen is soft. Tenderness: There is abdominal tenderness. There is no guarding or rebound. Comments: LLQ tenderness   Musculoskeletal:      Cervical back: Normal range of motion and neck supple. Skin:     General: Skin is dry. Neurological:      General: No focal deficit present. Mental Status: She is alert. Mental status is at baseline.    Psychiatric:         Mood and Affect: Mood normal.          Lab/Data Review:  Recent Results (from the past 24 hour(s))   EKG, 12 LEAD, INITIAL    Collection Time: 05/19/22  8:38 AM   Result Value Ref Range    Ventricular Rate 76 BPM    Atrial Rate 76 BPM    P-R Interval 160 ms    QRS Duration 90 ms Q-T Interval 420 ms    QTC Calculation (Bezet) 472 ms    Calculated P Axis 12 degrees    Calculated R Axis -27 degrees    Calculated T Axis 22 degrees    Diagnosis       Sinus rhythm with marked sinus arrhythmia  Otherwise normal ECG  No previous ECGs available  Confirmed by Zohra Guerrero ([id number]) on 5/19/2022 9:21:44 PM     METABOLIC PANEL, COMPREHENSIVE    Collection Time: 05/19/22  9:08 AM   Result Value Ref Range    Sodium 139 136 - 145 mmol/L    Potassium 3.9 3.5 - 5.1 mmol/L    Chloride 106 97 - 108 mmol/L    CO2 25 21 - 32 mmol/L    Anion gap 8 5 - 15 mmol/L    Glucose 81 65 - 100 mg/dL    BUN 20 6 - 20 mg/dL    Creatinine 0.73 0.55 - 1.02 mg/dL    BUN/Creatinine ratio 27 (H) 12 - 20      GFR est AA >60 >60 ml/min/1.73m2    GFR est non-AA >60 >60 ml/min/1.73m2    Calcium 9.4 8.5 - 10.1 mg/dL    Bilirubin, total 0.6 0.2 - 1.0 mg/dL    AST (SGOT) 21 15 - 37 U/L    ALT (SGPT) 21 12 - 78 U/L    Alk. phosphatase 96 45 - 117 U/L    Protein, total 6.6 6.4 - 8.2 g/dL    Albumin 3.1 (L) 3.5 - 5.0 g/dL    Globulin 3.5 2.0 - 4.0 g/dL    A-G Ratio 0.9 (L) 1.1 - 2.2     MAGNESIUM    Collection Time: 05/19/22  9:08 AM   Result Value Ref Range    Magnesium 1.9 1.6 - 2.4 mg/dL   CBC WITH AUTOMATED DIFF    Collection Time: 05/19/22  9:08 AM   Result Value Ref Range    WBC 11.1 (H) 3.6 - 11.0 K/uL    RBC 3.83 3.80 - 5.20 M/uL    HGB 11.3 (L) 11.5 - 16.0 g/dL    HCT 35.3 35.0 - 47.0 %    MCV 92.2 80.0 - 99.0 FL    MCH 29.5 26.0 - 34.0 PG    MCHC 32.0 30.0 - 36.5 g/dL    RDW 14.8 (H) 11.5 - 14.5 %    PLATELET 241 310 - 222 K/uL    MPV 11.8 8.9 - 12.9 FL    NRBC 0.0 0.0  WBC    ABSOLUTE NRBC 0.00 0.00 - 0.01 K/uL    NEUTROPHILS 83 (H) 32 - 75 %    LYMPHOCYTES 11 (L) 12 - 49 %    MONOCYTES 5 5 - 13 %    EOSINOPHILS 1 0 - 7 %    BASOPHILS 0 0 - 1 %    IMMATURE GRANULOCYTES 0 0 - 0.5 %    ABS. NEUTROPHILS 9.1 (H) 1.8 - 8.0 K/UL    ABS. LYMPHOCYTES 1.2 0.8 - 3.5 K/UL    ABS. MONOCYTES 0.6 0.0 - 1.0 K/UL    ABS.  EOSINOPHILS 0.1 0.0 - 0.4 K/UL    ABS. BASOPHILS 0.0 0.0 - 0.1 K/UL    ABS. IMM.  GRANS. 0.0 0.00 - 0.04 K/UL    DF AUTOMATED     ECHO ADULT COMPLETE    Collection Time: 05/19/22 11:00 AM   Result Value Ref Range    AR .0 ms    AR Max Velocity PISA 3.7 m/s    AV Peak Velocity 2.8 m/s    AV Peak Gradient 31 mmHg    AV Mean Gradient 18 mmHg    AV VTI 70.3 cm    AV Mean Velocity 1.9 m/s    AV Area by VTI 1.6 cm2    AV Area by Peak Velocity 1.3 cm2    Aortic Root 3.0 cm    IVSd 1.6 (A) 0.6 - 0.9 cm    LVIDd 3.8 (A) 3.9 - 5.3 cm    LVIDs 2.7 cm    LVOT Diameter 2.0 cm    LVOT Mean Gradient 2 mmHg    LVOT VTI 36.4 cm    LVOT Peak Velocity 1.2 m/s    LVOT Peak Gradient 5 mmHg    LVPWd 1.3 (A) 0.6 - 0.9 cm    LV E' Lateral Velocity 8 cm/s    LV E' Septal Velocity 4 cm/s    LVOT Area 3.1 cm2    LVOT .3 ml    LA Diameter 3.1 cm    MV E Wave Deceleration Time 215.0 ms    MV A Velocity 1.53 m/s    MV E Velocity 0.92 m/s    MV Mean Gradient 4 mmHg    MV VTI 52.7 cm    MV Mean Velocity 0.9 m/s    MV Max Velocity 1.9 m/s    MV Peak Gradient 14 mmHg    MV Area by VTI 2.2 cm2    PV Max Velocity 1.4 m/s    PV Peak Gradient 8 mmHg    Pulm Vein A Duration 169.0 ms    Pulm Vein A Velocity 0.6 m/s    RVIDd 3.4 cm    TR Max Velocity 3.04 m/s    TR Peak Gradient 37 mmHg    Fractional Shortening 2D 29 28 - 44 %    LVIDd Index 2.28 cm/m2    LVIDs Index 1.62 cm/m2    LV RWT Ratio 0.68     LV Mass 2D 205.2 (A) 67 - 162 g    LV Mass 2D Index 122.9 (A) 43 - 95 g/m2    MV E/A 0.60     E/E' Ratio (Averaged) 17.25     E/E' Lateral 11.50     E/E' Septal 23.00     LVOT Stroke Volume Index 68.4 mL/m2    LA Size Index 1.86 cm/m2    LA/AO Root Ratio 1.03     Ao Root Index 1.80 cm/m2    AV Velocity Ratio 0.43     LVOT:AV VTI Index 0.52     NANCY/BSA VTI 1.0 cm2/m2    NANCY/BSA Peak Velocity 0.8 cm2/m2    MV:LVOT VTI Index 1.45     EF BP 56 55 - 100 %         Assessment and plan:      (1) Sigmoid diverticulitis with microperforation: NPO, zosyn, IVF, pain controlled on tylenol. PRN zofran    (2) HTN: continue amlodipine    (3) hypothyroidism: levothyroxine    DVT ppx: lovenox     DISPO: allow diet today, plan for dc tomorrow.      Signed By: Jesus Manuel Carreon MD     May 20, 2022

## 2022-05-20 NOTE — PROGRESS NOTES
Chief Complaint:      Subjective:  Ms. Kali Merino is a 80y.o. year old * female admitted for sigmoid diverticulitis with microperforation. Still has lower abdominal pain, no fever or chills, no nausea or vomiting. Review of Systems:   Constitutional:  no fever, no chills,  no sweats, No weakness, No fatigue, No decreased activity. Respiratory: No shortness of breath, No cough, No sputum production, No hemoptysis, No wheezing, No cyanosis. Cardiovascular: No chest pain, No palpitations, No bradycardia, No tachycardia, No peripheral edema, No syncope. Gastrointestinal: No nausea, No vomiting, No diarrhea, No constipation, No heartburn,  abdominal pain. Genitourinary: No dysuria, No hematuria, No change in urine stream, No urethral discharge, No lesions. Hematology/Lymphatics: No bruising tendency, No bleeding tendency, No petechiae, No swollen lymph glands. Endocrine: No excessive thirst, No polyuria, No cold intolerance, No heat intolerance, No excessive hunger. Musculoskeletal: No back pain, No neck pain, No joint pain, No muscle pain, No claudication, No decreased range of motion, No trauma. Integumentary: No rash, No pruritus, No abrasions. Neurologic: Alert and oriented X4, No abnormal balance, No headache, No confusion, No numbness, No tingling. Psychiatric: No anxiety, No depression, No ruben. Physical Exam:     Vitals & Measurements:     Wt Readings from Last 3 Encounters:   05/19/22 68 kg (149 lb 14.6 oz)   01/25/22 71.2 kg (156 lb 15.5 oz)   09/01/12 71.2 kg (157 lb)     Temp Readings from Last 3 Encounters:   05/19/22 98.4 °F (36.9 °C)   01/25/22 97.7 °F (36.5 °C)   07/07/20 98.5 °F (36.9 °C)     BP Readings from Last 3 Encounters:   05/19/22 (!) 169/84   01/25/22 115/66   07/07/20 159/53     Pulse Readings from Last 3 Encounters:   05/19/22 73   01/25/22 86   07/07/20 63      Ht Readings from Last 3 Encounters:   05/19/22 5' 1.02\" (1.55 m)   09/01/12 5' 1\" (1.549 m)      Date 05/18/22 1900 - 05/19/22 0659 05/19/22 0700 - 05/20/22 0659   Shift 3265-6968 24 Hour Total 8161-1739 9861-8596 24 Hour Total   INTAKE   Shift Total(mL/kg)        OUTPUT   Urine(mL/kg/hr) 900 900 650(0.8)  650     Urine Voided 900 900        Urine Output (mL) (External Urinary Catheter 05/19/22)   650  650   Shift Total(mL/kg) 900(13.3) 900(13.3) 650(9.6)  650(9.6)   NET -900 -900 -650  -650   Weight (kg) 67.6 67.6 68 68 68       General: well appearing, no acute distress  Head: Normal  Face: Nornal  HEENT: atraumatic, PERRLA, moist mucosa, normal pharynx, normal tonsils and adenoids, normal tongue, no fluid in sinuses  Neck: Trachea midline, no carotid bruit, no masses  Chest: Normal.  Respiratory: normal chest wall expansion, CTA B, no r/r/w, no rubs  Cardiovascular: RRR, no m/r/g, Normal S1 and S2  Abdomen: Soft, mild left lower quadrant tenderness, no guarding or rebound, non-distended, normal bowel sounds in all quadrants, no hepatosplenomegaly, no tympany. Incision scar: none  Genitourinary: No inguinal hernia, normal external gentalia, no renal angle tenderness  Rectal: deferred  Musculoskeletal: Normal ROM in upper and lower extremities, No joint swelling. Integumentary: Warm, dry, and pink, with no rash, purpura, or petechia  Heme/Lymph: No lymphadenopathy, no bruises  Neurological: Cranial Nerves II-XII grossly intact, No gross sensory or motor deficit.   Psychiatric: Cooperative with normal mood, affect, and cognition    Laboratory Values:   Recent Results (from the past 24 hour(s))   EKG, 12 LEAD, INITIAL    Collection Time: 05/19/22  8:38 AM   Result Value Ref Range    Ventricular Rate 76 BPM    Atrial Rate 76 BPM    P-R Interval 160 ms    QRS Duration 90 ms    Q-T Interval 420 ms    QTC Calculation (Bezet) 472 ms    Calculated P Axis 12 degrees    Calculated R Axis -27 degrees    Calculated T Axis 22 degrees    Diagnosis       Sinus rhythm with marked sinus arrhythmia  Otherwise normal ECG  No previous ECGs available  Confirmed by Marta Ordonez ([id number]) on 5/19/2022 6:97:90 PM     METABOLIC PANEL, COMPREHENSIVE    Collection Time: 05/19/22  9:08 AM   Result Value Ref Range    Sodium 139 136 - 145 mmol/L    Potassium 3.9 3.5 - 5.1 mmol/L    Chloride 106 97 - 108 mmol/L    CO2 25 21 - 32 mmol/L    Anion gap 8 5 - 15 mmol/L    Glucose 81 65 - 100 mg/dL    BUN 20 6 - 20 mg/dL    Creatinine 0.73 0.55 - 1.02 mg/dL    BUN/Creatinine ratio 27 (H) 12 - 20      GFR est AA >60 >60 ml/min/1.73m2    GFR est non-AA >60 >60 ml/min/1.73m2    Calcium 9.4 8.5 - 10.1 mg/dL    Bilirubin, total 0.6 0.2 - 1.0 mg/dL    AST (SGOT) 21 15 - 37 U/L    ALT (SGPT) 21 12 - 78 U/L    Alk. phosphatase 96 45 - 117 U/L    Protein, total 6.6 6.4 - 8.2 g/dL    Albumin 3.1 (L) 3.5 - 5.0 g/dL    Globulin 3.5 2.0 - 4.0 g/dL    A-G Ratio 0.9 (L) 1.1 - 2.2     MAGNESIUM    Collection Time: 05/19/22  9:08 AM   Result Value Ref Range    Magnesium 1.9 1.6 - 2.4 mg/dL   CBC WITH AUTOMATED DIFF    Collection Time: 05/19/22  9:08 AM   Result Value Ref Range    WBC 11.1 (H) 3.6 - 11.0 K/uL    RBC 3.83 3.80 - 5.20 M/uL    HGB 11.3 (L) 11.5 - 16.0 g/dL    HCT 35.3 35.0 - 47.0 %    MCV 92.2 80.0 - 99.0 FL    MCH 29.5 26.0 - 34.0 PG    MCHC 32.0 30.0 - 36.5 g/dL    RDW 14.8 (H) 11.5 - 14.5 %    PLATELET 121 523 - 562 K/uL    MPV 11.8 8.9 - 12.9 FL    NRBC 0.0 0.0  WBC    ABSOLUTE NRBC 0.00 0.00 - 0.01 K/uL    NEUTROPHILS 83 (H) 32 - 75 %    LYMPHOCYTES 11 (L) 12 - 49 %    MONOCYTES 5 5 - 13 %    EOSINOPHILS 1 0 - 7 %    BASOPHILS 0 0 - 1 %    IMMATURE GRANULOCYTES 0 0 - 0.5 %    ABS. NEUTROPHILS 9.1 (H) 1.8 - 8.0 K/UL    ABS. LYMPHOCYTES 1.2 0.8 - 3.5 K/UL    ABS. MONOCYTES 0.6 0.0 - 1.0 K/UL    ABS. EOSINOPHILS 0.1 0.0 - 0.4 K/UL    ABS. BASOPHILS 0.0 0.0 - 0.1 K/UL    ABS. IMM.  GRANS. 0.0 0.00 - 0.04 K/UL    DF AUTOMATED     ECHO ADULT COMPLETE    Collection Time: 05/19/22 11:00 AM   Result Value Ref Range    AR .0 ms    AR Max Velocity PISA 3.7 m/s    AV Peak Velocity 2.8 m/s    AV Peak Gradient 31 mmHg    AV Mean Gradient 18 mmHg    AV VTI 70.3 cm    AV Mean Velocity 1.9 m/s    AV Area by VTI 1.6 cm2    AV Area by Peak Velocity 1.3 cm2    Aortic Root 3.0 cm    IVSd 1.6 (A) 0.6 - 0.9 cm    LVIDd 3.8 (A) 3.9 - 5.3 cm    LVIDs 2.7 cm    LVOT Diameter 2.0 cm    LVOT Mean Gradient 2 mmHg    LVOT VTI 36.4 cm    LVOT Peak Velocity 1.2 m/s    LVOT Peak Gradient 5 mmHg    LVPWd 1.3 (A) 0.6 - 0.9 cm    LV E' Lateral Velocity 8 cm/s    LV E' Septal Velocity 4 cm/s    LVOT Area 3.1 cm2    LVOT .3 ml    LA Diameter 3.1 cm    MV E Wave Deceleration Time 215.0 ms    MV A Velocity 1.53 m/s    MV E Velocity 0.92 m/s    MV Mean Gradient 4 mmHg    MV VTI 52.7 cm    MV Mean Velocity 0.9 m/s    MV Max Velocity 1.9 m/s    MV Peak Gradient 14 mmHg    MV Area by VTI 2.2 cm2    PV Max Velocity 1.4 m/s    PV Peak Gradient 8 mmHg    Pulm Vein A Duration 169.0 ms    Pulm Vein A Velocity 0.6 m/s    RVIDd 3.4 cm    TR Max Velocity 3.04 m/s    TR Peak Gradient 37 mmHg    Fractional Shortening 2D 29 28 - 44 %    LVIDd Index 2.28 cm/m2    LVIDs Index 1.62 cm/m2    LV RWT Ratio 0.68     LV Mass 2D 205.2 (A) 67 - 162 g    LV Mass 2D Index 122.9 (A) 43 - 95 g/m2    MV E/A 0.60     E/E' Ratio (Averaged) 17.25     E/E' Lateral 11.50     E/E' Septal 23.00     LVOT Stroke Volume Index 68.4 mL/m2    LA Size Index 1.86 cm/m2    LA/AO Root Ratio 1.03     Ao Root Index 1.80 cm/m2    AV Velocity Ratio 0.43     LVOT:AV VTI Index 0.52     NANCY/BSA VTI 1.0 cm2/m2    NANCY/BSA Peak Velocity 0.8 cm2/m2    MV:LVOT VTI Index 1.45     EF BP 56 55 - 100 %         CT ABD PELV W CONT   Final Result   Findings most concerning for diverticulitis involving the sigmoid colon with   localized/microscopic perforation. No free air or drainable abscess. Given   patient age, this warrants follow-up with colonoscopy after acute episode.       Mild dilatation of the common duct and the pancreatic duct without discrete mass   seen in the pancreatic head. Obstruction at the ampullar cannot be excluded. Recommend correlation with MRCP with and without contrast, or ERCP. COMMUNICATION:   The result was discussed with Dr. Sylvia Ng. CT ABD PELV W CONT    (Results Pending)       ECG    Assessment:  Problem List Items Addressed This Visit     None      Visit Diagnoses     Abdominal pain, LLQ (left lower quadrant)    -  Primary    Diverticulitis               Plan:    1. Admission  2. Diet: NPO   3. IV fluids  4. SCD  5. IS  6. Pain medications  7. Antibiotics  8. Nausea medication  9. Cordero  10. Labs & Radiology  11. Awaiting repeat CT scan result  12. Plan discussed with patient and family and answered all their questions. Thank you for allowing me to participate in the care of this patient.

## 2022-05-20 NOTE — PROGRESS NOTES
Problem: Falls - Risk of  Goal: *Absence of Falls  Description: Document Sg Ballesteros Fall Risk and appropriate interventions in the flowsheet. Outcome: Progressing Towards Goal  Note: Fall Risk Interventions:  Mobility Interventions: Bed/chair exit alarm    Mentation Interventions: Adequate sleep, hydration, pain control,Bed/chair exit alarm,Door open when patient unattended    Medication Interventions: Bed/chair exit alarm    Elimination Interventions: Bed/chair exit alarm,Call light in reach              Problem: Patient Education: Go to Patient Education Activity  Goal: Patient/Family Education  Outcome: Progressing Towards Goal     Problem: Pressure Injury - Risk of  Goal: *Prevention of pressure injury  Description: Document Victor Hugo Scale and appropriate interventions in the flowsheet. Outcome: Progressing Towards Goal  Note: Pressure Injury Interventions:  Sensory Interventions: Assess changes in LOC,Assess need for specialty bed,Keep linens dry and wrinkle-free,Maintain/enhance activity level,Minimize linen layers    Moisture Interventions: Absorbent underpads,Apply protective barrier, creams and emollients,Minimize layers,Maintain skin hydration (lotion/cream)    Activity Interventions: Assess need for specialty bed,Increase time out of bed,PT/OT evaluation    Mobility Interventions: Assess need for specialty bed,HOB 30 degrees or less,PT/OT evaluation,Turn and reposition approx.  every two hours(pillow and wedges)    Nutrition Interventions: Document food/fluid/supplement intake,Offer support with meals,snacks and hydration                     Problem: Patient Education: Go to Patient Education Activity  Goal: Patient/Family Education  Outcome: Progressing Towards Goal     Problem: General Infection Care Plan (Adult and Pediatric)  Goal: Improvement in signs and symptoms of infection  Outcome: Progressing Towards Goal  Goal: *Optimize nutritional status  Outcome: Progressing Towards Goal     Problem: Patient Education: Go to Patient Education Activity  Goal: Patient/Family Education  Outcome: Progressing Towards Goal

## 2022-05-20 NOTE — PROGRESS NOTES
Problem: Falls - Risk of  Goal: *Absence of Falls  Description: Document Lawtey Fall Risk and appropriate interventions in the flowsheet. Outcome: Progressing Towards Goal  Note: Fall Risk Interventions:  Mobility Interventions: Bed/chair exit alarm,Utilize walker, cane, or other assistive device    Mentation Interventions: Bed/chair exit alarm,More frequent rounding,Toileting rounds    Medication Interventions: Bed/chair exit alarm    Elimination Interventions: Bed/chair exit alarm,Call light in reach              Problem: Patient Education: Go to Patient Education Activity  Goal: Patient/Family Education  Outcome: Progressing Towards Goal     Problem: Pressure Injury - Risk of  Goal: *Prevention of pressure injury  Description: Document Victor Hugo Scale and appropriate interventions in the flowsheet.   Outcome: Progressing Towards Goal  Note: Pressure Injury Interventions:  Sensory Interventions: Assess changes in LOC,Float heels,Keep linens dry and wrinkle-free,Minimize linen layers    Moisture Interventions: Absorbent underpads,Minimize layers,Maintain skin hydration (lotion/cream),Check for incontinence Q2 hours and as needed,Apply protective barrier, creams and emollients    Activity Interventions: Increase time out of bed,PT/OT evaluation    Mobility Interventions: Float heels,PT/OT evaluation    Nutrition Interventions: Offer support with meals,snacks and hydration                     Problem: Patient Education: Go to Patient Education Activity  Goal: Patient/Family Education  Outcome: Progressing Towards Goal     Problem: General Infection Care Plan (Adult and Pediatric)  Goal: Improvement in signs and symptoms of infection  Outcome: Progressing Towards Goal  Goal: *Optimize nutritional status  Outcome: Progressing Towards Goal     Problem: Patient Education: Go to Patient Education Activity  Goal: Patient/Family Education  Outcome: Progressing Towards Goal

## 2022-05-21 ENCOUNTER — APPOINTMENT (OUTPATIENT)
Dept: GENERAL RADIOLOGY | Age: 87
DRG: 392 | End: 2022-05-21
Attending: SURGERY
Payer: MEDICARE

## 2022-05-21 LAB
APPEARANCE UR: CLEAR
BACTERIA URNS QL MICRO: NEGATIVE /HPF
BILIRUB UR QL: NEGATIVE
COLOR UR: ABNORMAL
GLUCOSE UR STRIP.AUTO-MCNC: NEGATIVE MG/DL
HGB UR QL STRIP: ABNORMAL
KETONES UR QL STRIP.AUTO: 5 MG/DL
LEUKOCYTE ESTERASE UR QL STRIP.AUTO: ABNORMAL
MUCOUS THREADS URNS QL MICRO: ABNORMAL /LPF
NITRITE UR QL STRIP.AUTO: NEGATIVE
PH UR STRIP: 5 [PH] (ref 5–8)
PROT UR STRIP-MCNC: 30 MG/DL
RBC #/AREA URNS HPF: ABNORMAL /HPF (ref 0–5)
SP GR UR REFRACTOMETRY: 1.02 (ref 1–1.03)
UROBILINOGEN UR QL STRIP.AUTO: 0.1 EU/DL (ref 0.1–1)
WBC CASTS URNS QL MICRO: PRESENT
WBC URNS QL MICRO: ABNORMAL /HPF (ref 0–4)

## 2022-05-21 PROCEDURE — 87086 URINE CULTURE/COLONY COUNT: CPT

## 2022-05-21 PROCEDURE — 74022 RADEX COMPL AQT ABD SERIES: CPT

## 2022-05-21 PROCEDURE — 74011250637 HC RX REV CODE- 250/637: Performed by: INTERNAL MEDICINE

## 2022-05-21 PROCEDURE — 74011250636 HC RX REV CODE- 250/636: Performed by: SURGERY

## 2022-05-21 PROCEDURE — 65270000029 HC RM PRIVATE

## 2022-05-21 PROCEDURE — 74011000258 HC RX REV CODE- 258: Performed by: INTERNAL MEDICINE

## 2022-05-21 PROCEDURE — 74011250637 HC RX REV CODE- 250/637: Performed by: HOSPITALIST

## 2022-05-21 PROCEDURE — 81001 URINALYSIS AUTO W/SCOPE: CPT

## 2022-05-21 PROCEDURE — 74011250636 HC RX REV CODE- 250/636: Performed by: HOSPITALIST

## 2022-05-21 PROCEDURE — 74011250636 HC RX REV CODE- 250/636: Performed by: INTERNAL MEDICINE

## 2022-05-21 RX ORDER — SODIUM CHLORIDE 9 MG/ML
125 INJECTION, SOLUTION INTRAVENOUS CONTINUOUS
Status: DISCONTINUED | OUTPATIENT
Start: 2022-05-21 | End: 2022-05-23 | Stop reason: HOSPADM

## 2022-05-21 RX ORDER — L. ACIDOPHILUS/PECTIN, CITRUS 25MM-100MG
1 TABLET ORAL DAILY
Status: DISCONTINUED | OUTPATIENT
Start: 2022-05-21 | End: 2022-05-23 | Stop reason: HOSPADM

## 2022-05-21 RX ORDER — HYDROMORPHONE HYDROCHLORIDE 1 MG/ML
0.5 INJECTION, SOLUTION INTRAMUSCULAR; INTRAVENOUS; SUBCUTANEOUS
Status: DISCONTINUED | OUTPATIENT
Start: 2022-05-21 | End: 2022-05-21

## 2022-05-21 RX ORDER — KETOROLAC TROMETHAMINE 15 MG/ML
15 INJECTION, SOLUTION INTRAMUSCULAR; INTRAVENOUS
Status: DISCONTINUED | OUTPATIENT
Start: 2022-05-21 | End: 2022-05-22

## 2022-05-21 RX ORDER — FAMOTIDINE 10 MG/1
10 TABLET ORAL
Qty: 7 TABLET | Refills: 0 | Status: SHIPPED | OUTPATIENT
Start: 2022-05-21

## 2022-05-21 RX ORDER — AMOXICILLIN AND CLAVULANATE POTASSIUM 500; 125 MG/1; MG/1
1 TABLET, FILM COATED ORAL 2 TIMES DAILY
Qty: 14 TABLET | Refills: 0 | Status: SHIPPED | OUTPATIENT
Start: 2022-05-21 | End: 2022-05-28

## 2022-05-21 RX ORDER — PANTOPRAZOLE SODIUM 40 MG/1
40 TABLET, DELAYED RELEASE ORAL
Status: DISCONTINUED | OUTPATIENT
Start: 2022-05-21 | End: 2022-05-23 | Stop reason: HOSPADM

## 2022-05-21 RX ADMIN — AMLODIPINE BESYLATE 5 MG: 5 TABLET ORAL at 10:38

## 2022-05-21 RX ADMIN — PANTOPRAZOLE SODIUM 40 MG: 40 TABLET, DELAYED RELEASE ORAL at 10:38

## 2022-05-21 RX ADMIN — PIPERACILLIN SODIUM AND TAZOBACTAM SODIUM 3.38 G: 3; .375 INJECTION, POWDER, LYOPHILIZED, FOR SOLUTION INTRAVENOUS at 10:38

## 2022-05-21 RX ADMIN — CHOLESTYRAMINE 4 G: 4 POWDER, FOR SUSPENSION ORAL at 10:38

## 2022-05-21 RX ADMIN — ASPIRIN 81 MG: 81 TABLET, COATED ORAL at 10:38

## 2022-05-21 RX ADMIN — SODIUM CHLORIDE 75 ML/HR: 9 INJECTION, SOLUTION INTRAVENOUS at 22:20

## 2022-05-21 RX ADMIN — ACETAMINOPHEN 650 MG: 325 TABLET ORAL at 01:50

## 2022-05-21 RX ADMIN — ONDANSETRON 4 MG: 2 INJECTION INTRAMUSCULAR; INTRAVENOUS at 18:02

## 2022-05-21 RX ADMIN — HYDROMORPHONE HYDROCHLORIDE 0.5 MG: 1 INJECTION, SOLUTION INTRAMUSCULAR; INTRAVENOUS; SUBCUTANEOUS at 17:55

## 2022-05-21 RX ADMIN — Medication 1 TABLET: at 10:38

## 2022-05-21 RX ADMIN — PIPERACILLIN SODIUM AND TAZOBACTAM SODIUM 3.38 G: 3; .375 INJECTION, POWDER, LYOPHILIZED, FOR SOLUTION INTRAVENOUS at 00:11

## 2022-05-21 RX ADMIN — KETOROLAC TROMETHAMINE 15 MG: 15 INJECTION, SOLUTION INTRAMUSCULAR; INTRAVENOUS at 23:53

## 2022-05-21 RX ADMIN — ENOXAPARIN SODIUM 30 MG: 100 INJECTION SUBCUTANEOUS at 10:38

## 2022-05-21 RX ADMIN — Medication 1000 UNITS: at 10:38

## 2022-05-21 RX ADMIN — LOSARTAN POTASSIUM 100 MG: 50 TABLET, FILM COATED ORAL at 10:38

## 2022-05-21 RX ADMIN — SODIUM CHLORIDE 150 ML/HR: 9 INJECTION, SOLUTION INTRAVENOUS at 10:41

## 2022-05-21 RX ADMIN — ACETAMINOPHEN 650 MG: 325 TABLET ORAL at 15:19

## 2022-05-21 RX ADMIN — PIPERACILLIN SODIUM AND TAZOBACTAM SODIUM 3.38 G: 3; .375 INJECTION, POWDER, LYOPHILIZED, FOR SOLUTION INTRAVENOUS at 17:45

## 2022-05-21 NOTE — DISCHARGE SUMMARY
Physician Discharge Summary     Patient ID:    Reina Mcmanus  [id number]  87 y.o.  3/20/1925    Admit date: 5/18/2022    Discharge date : 5/21/2022    Chronic Diagnoses:    Problem List as of 5/21/2022 Date Reviewed: 9/2/2012          Codes Class Noted - Resolved    Moderate protein-energy malnutrition ([facility] [address] ICD-10-CM: E44.0  ICD-9-CM: 263.0  5/19/2022 - Present        Diverticulitis of large intestine with perforation ICD-10-CM: K57.20  ICD-9-CM: 562.11  5/18/2022 - Present        Fracture of right humerus ICD-10-CM: S42.301A  ICD-9-CM: 812.20  9/1/2012 - Present          22    Final Diagnoses:   Diverticulitis of large intestine with perforation [K57.20]    Reason for Hospitalization:  (1) Sigmoid diverticulitis with microperforation: NPO, zosyn, IVF, pain controlled on tylenol. PRN zofran     (2) HTN: continue amlodipine     (3) hypothyroidism: levothyroxine    (4) urinary retention: hart placed. F/u with urology as OP. Hospital Course:     89F< with sigmoid diverticulitis with microperforation.     HOSPITAL COURSE:   89F with past medical history for arthritis, hypertension, hypothyroidism, recent falls, lives in a womens home because of physical debility. 5/18/22 presents to hospital because of abdominal pains. Development over the previous one month. Primarily in the left lower quadrant. Crampy and intermittent. Worsens with movement. Associated with nausea. During the ED course CT imaging suggestive of sigmoid diverticulitis with microperforation. Managed with fluids and broad spectrum antibiotics . Abdominal pain better today, will allow clear liquid diet today, and advanced diet. She was also found to have UTI.         INSTRUCTIONS ON DISCHARGE    (1) please take the antibiotic as follows:              AUGMENTIN 500 mg twice a day for 7 days    Please note that this antibiotic may cause heart burn, so please take the antibiotic with food, you may take FAMOTIDINE as needed for heart burn, and PRO biotics for 7 days    (3) please follow-up with PCP and surgery as needed. Discharge Medications:   Current Discharge Medication List      START taking these medications    Details   amoxicillin-clavulanate (Augmentin) 500-125 mg per tablet Take 1 Tablet by mouth two (2) times a day for 7 days. Qty: 14 Tablet, Refills: 0  Start date: 5/21/2022, End date: 5/28/2022      famotidine (PEPCID) 10 mg tablet Take 1 Tablet by mouth daily as needed for Heartburn. Qty: 7 Tablet, Refills: 0  Start date: 5/21/2022      Saccharomyces boulardii 250 mg pwpk Take 1 Packet by mouth daily. Qty: 7 Packet, Refills: 0  Start date: 5/21/2022         CONTINUE these medications which have NOT CHANGED    Details   cholestyramine (QUESTRAN) 4 gram packet Take 1 Packet by mouth every eight (8) hours as needed for Diarrhea. cyanocobalamin (VITAMIN B12) 500 mcg tablet Take 500 mcg by mouth daily. losartan (COZAAR) 100 mg tablet Take 100 mg by mouth daily. acetaminophen (TYLENOL) 500 mg tablet Take 1,000 mg by mouth three (3) times daily as needed for Pain. ferrous sulfate (IRON) 325 mg (65 mg iron) EC tablet Take 325 mg by mouth daily. fluocinoNIDE (LIDEX) 0.05 % external solution Apply  to affected area every six (6) hours. Apply to scalp every 6 hours as needed for itching      ondansetron hcl (ZOFRAN) 4 mg tablet Take 4 mg by mouth every six (6) hours as needed for Nausea or Vomiting. polyethylene glycol (MIRALAX) 17 gram packet Take 17 g by mouth daily as needed for Constipation. triamcinolone acetonide (KENALOG) 0.1 % topical cream Apply  to affected area two (2) times daily as needed for Itching. Apply to rash twice daily as needed for itching      diclofenac (VOLTAREN) 1 % gel Apply 1 g to affected area three (3) times daily as needed for Pain. Apply to each shoulder and each hand      cholecalciferol (Vitamin D3) (1000 Units /25 mcg) tablet Take 1,000 Units by mouth daily. coenzyme q10 (Co Q-10) 10 mg cap Take 1 Tablet by mouth daily. aspirin delayed-release 81 mg tablet Take 1 Tablet by mouth two (2) times a day. Qty: 60 Tablet, Refills: 0      simvastatin (ZOCOR) 20 mg tablet Take 20 mg by mouth daily. levothyroxine (Synthroid) 75 mcg tablet Take 75 mcg by mouth Daily (before breakfast). STOP taking these medications       amLODIPine (NORVASC) 5 mg tablet Comments:   Reason for Stopping: Follow up Care:    1. Ben Gonzáles MD in 1-2 weeks. Please call to set up an appointment shortly after discharge. Diet: cardiac    Disposition:  Home with [facility]    Advanced Directive:   FULL x   DNR      Discharge Exam:  Physical Exam  Vitals and nursing note reviewed. HENT:      Head: Normocephalic and atraumatic.      Nose: Nose normal.      Mouth/Throat:      Mouth: Mucous membranes are dry. Eyes:      Extraocular Movements: Extraocular movements intact.      Pupils: Pupils are equal, round, and reactive to light. Cardiovascular:      Rate and Rhythm: Normal rate and regular rhythm.      Pulses: Normal pulses.      Heart sounds: Normal heart sounds. Pulmonary:      Effort: Pulmonary effort is normal.      Breath sounds: Normal breath sounds. Abdominal:      General: Abdomen is flat.      Palpations: Abdomen is soft.      Tenderness: There is abdominal tenderness. There is no guarding or rebound.      Comments: LLQ tenderness   Musculoskeletal:      Cervical back: Normal range of motion and neck supple. Skin:     General: Skin is dry. Neurological:      General: No focal deficit present.      Mental Status: She is alert. Mental status is at baseline.    Psychiatric: Rishabh Cullen and Affect: Mood normal.     CONSULTATIONS: surgery    Significant Diagnostic Studies:     Radiologic Studies -   Results from Hospital Encounter encounter on 07/07/20    XR CHEST PA LAT    Narrative  Chest PA and lateral    History: Shortness of breath    Comparison: 9/1/2012    Findings: The lungs are well expanded. No focal consolidation, pleural  effusion, or pneumothorax. The cardiomediastinal silhouette is unremarkable. Hardware is present in the proximal right humerus. There is mild rightward  curvature of the thoracic spine. Impression  Impression:  No acute cardiopulmonary process. CT Results  (Last 48 hours)               05/20/22 0940  CT PELV WO CONT Final result    Impression:  1. There is a distended trabeculated bladder consistent with bladder outlet   obstruction versus neurogenic changes of the bladder. 2.  There is chronic diverticulosis of the sigmoid colon. There is an   extraluminal air collection with a smaller component of fluid along the   posterior wall of the mid sigmoid colon. This extraluminal collection was not   opacified with the oral contrast material as the oral contrast material   progressed into the sigmoid colon. Narrative: This study is a CT evaluation of the pelvis dated 5/20/2022 obtained at 9:36 AM.       HISTORY: Acute diverticulitis. TECHNIQUE: This examination is performed without intravenous contrast   administration. 3 mm axial imaging is submitted for interpretation. From the   axial imaging sequence sagittal and coronal reconstructed imaging is submitted   for evaluation. Dose Reduction Technique was employed to reduce radiation exposure - This   includes reduction optimization techniques as appropriate to a performed exam   with automated exposure control adjustments of the mA and/or Kv according to   patient size, or use of iterative reconstruction technique. COMPARISON: CT evaluation of the abdomen and pelvis with IV contrast   administration dated 5/19/2022. FINDINGS: There is contrast opacification of the bladder secondary to the   previously performed contrast enhanced CT evaluation.  There are multiple   diverticula and trabeculation along the bladder dome which may be a feature associated with bladder outlet obstruction or a neurogenic bladder. There is a small atrophic uterus with calcification along the posterior lower   uterine wall most compatible with a small calcified leiomyoma. There are multiple diverticula of the descending and sigmoid colon. There is   improved contrast opacification of the sigmoid colon. There is an air-filled   cavity located posterior to the mid sigmoid colon which measures 36 mm in its AP   dimension by 37 mm in its transverse dimension. This collection is primarily   filled with air with a smaller component of fluid along its posterior margin. This examination is negative for contrast extravasation into this fluid   collection. This examination is negative for gas within the bladder lumen to suggest a   colovesical fistula. There are degenerative changes along the lower lumbosacral spine. 05/19/22 1915  CT ABD PELV W CONT Final result    Impression:  Reidentification of changes of presumed diverticulitis in the   sigmoid colon. Enteric contrast that was administered did not adequately   challenge the area in question and a contained perforation is not excluded by   this exam.. Multiple other findings as above including dilated biliary ducts,   renal cysts, calcified uterine fibroids       A HIPPA compliant text regarding the findings and/or need for follow-up as   described in this report was sent to [facility] via the Perfect Serve   application at the time of this interpretation. Narrative:  HISTORY:  perforated diverticulitis   Dose reduction technique: All CT scans at this facility are performed using dose reduction optimization   technique as appropriate on the exam including the following: Automated exposure   control, adjustment of the MA and/or KV according to patient size and/or use of   iterative reconstructive technique.        TECHNIQUE:  CT ABD PELV W CONT                            100 cc Isovue-370 injected. Oral contrast/Gastrografin   was administered 3 hours prior to the exam       COMPARISON: One day prior CT abdomen pelvis which identified sigmoid   diverticulitis. LIMITATIONS: None       CHEST: No acute airspace process or pleural effusion seen at the lung bases. LIVER: Normal   GALLBLADDER: Distended with heterogeneous hyperdensity within it and thought to   represent vicarious contrast excretion related to patient's previous exam   BILIARY TREE: Extra hepatic duct is dilated up to 1.3 cm based on coronal image   50 series 202. PANCREAS: Normal   SPLEEN: Normal   ADRENAL GLANDS: Normal   KIDNEYS/URETERS/BLADDER: Multiple bilateral renal cysts as on prior. AORTA/RETROPERITONEUM: Normal   BOWEL/MESENTERY: Enteric contrast is present having transited to and through the   small bowel and the proximal half of the colon. The contrast is only just   getting to the area in question of the sigmoid colon and not clearly within the   rectum. The focal wall thickening in the same distribution as diverticulosis   with what appears to be an extra enteric air collection posterior border of the   sigmoid colon is unchanged from the prior. Adjacent fat stranding in this region   as on previous. Remainder of the bowel is unremarkable. APPENDIX: Identified and normal   PERITONEAL CAVITY: Normal   REPRODUCTIVE ORGANS:  calcified involuted fibroids again noted. BONE/TISSUES: No acute abnormality.        OTHER: None                     Discharge time spent 35 minutes    Signed:  Judith Peabody, MD  5/21/2022  8:27 AM

## 2022-05-21 NOTE — PROGRESS NOTES
Cordero placed as ordered. 1200ml output immediately. Urine and urine culture sent to lab per protocol.

## 2022-05-21 NOTE — PROGRESS NOTES
Chief Complaint:      Subjective:  Ms. Idalia Chauhan is a 80y.o. year old * female admitted for sigmoid diverticulitis with microperforation. Still has some lower abdominal pain, no fever or chills, no nausea or vomiting. CT scan of abdomen and pelvis with PO contrast shows resolution of diverticulitis and no extravasation of PO contrast in to the large diverticulum located in pelvis. However the bladder is pretty distended consistent with retention of urine inspite of purewick. Had a BM yesterday. Review of Systems:   Constitutional:  no fever, no chills,  no sweats, No weakness, No fatigue, No decreased activity. Respiratory: No shortness of breath, No cough, No sputum production, No hemoptysis, No wheezing, No cyanosis. Cardiovascular: No chest pain, No palpitations, No bradycardia, No tachycardia, No peripheral edema, No syncope. Gastrointestinal: No nausea, No vomiting, No diarrhea, No constipation, No heartburn,  abdominal pain. Genitourinary: No dysuria, No hematuria, No change in urine stream, No urethral discharge, No lesions. Hematology/Lymphatics: No bruising tendency, No bleeding tendency, No petechiae, No swollen lymph glands. Endocrine: No excessive thirst, No polyuria, No cold intolerance, No heat intolerance, No excessive hunger. Musculoskeletal: No back pain, No neck pain, No joint pain, No muscle pain, No claudication, No decreased range of motion, No trauma. Integumentary: No rash, No pruritus, No abrasions. Neurologic: Alert and oriented X4, No abnormal balance, No headache, No confusion, No numbness, No tingling. Psychiatric: No anxiety, No depression, No ruben. Physical Exam:     Vitals & Measurements:     Wt Readings from Last 3 Encounters:   05/19/22 68 kg (149 lb 14.6 oz)   01/25/22 71.2 kg (156 lb 15.5 oz)   09/01/12 71.2 kg (157 lb)     Temp Readings from Last 3 Encounters:   05/21/22 97.9 °F (36.6 °C)   01/25/22 97.7 °F (36.5 °C)   07/07/20 98.5 °F (36.9 °C) BP Readings from Last 3 Encounters:   05/21/22 (!) 140/67   01/25/22 115/66   07/07/20 159/53     Pulse Readings from Last 3 Encounters:   05/21/22 87   01/25/22 86   07/07/20 63      Ht Readings from Last 3 Encounters:   05/19/22 5' 1.02\" (1.55 m)   09/01/12 5' 1\" (1.549 m)      Date 05/20/22 0700 - 05/21/22 0659 05/21/22 0700 - 05/22/22 0659   Shift 8150-0275 4020-6312 24 Hour Total 2662-3378 5549-7587 24 Hour Total   INTAKE   Shift Total(mL/kg)         OUTPUT   Urine(mL/kg/hr) 300(0.4) 500 800        Urine Voided 300 500 800        Urine Occurrence(s) 1 x  1 x      Shift Total(mL/kg) 300(4.4) 500(7.4) 800(11.8)      NET -300 -500 -800      Weight (kg) 68 68 68 68 68 68       General: well appearing, no acute distress  Head: Normal  Face: Nornal  HEENT: atraumatic, PERRLA, moist mucosa, normal pharynx, normal tonsils and adenoids, normal tongue, no fluid in sinuses  Neck: Trachea midline, no carotid bruit, no masses  Chest: Normal.  Respiratory: normal chest wall expansion, CTA B, no r/r/w, no rubs  Cardiovascular: RRR, no m/r/g, Normal S1 and S2  Abdomen: Soft, mild suprapubic tenderness, no guarding or rebound, non-distended, normal bowel sounds in all quadrants, no hepatosplenomegaly, no tympany. Incision scar: none  Genitourinary: No inguinal hernia, Horseshoe shaped dullness in suprapubic area, normal external gentalia, no renal angle tenderness  Rectal: deferred  Musculoskeletal: Normal ROM in upper and lower extremities, No joint swelling. Integumentary: Warm, dry, and pink, with no rash, purpura, or petechia  Heme/Lymph: No lymphadenopathy, no bruises  Neurological: Cranial Nerves II-XII grossly intact, No gross sensory or motor deficit. Psychiatric: Cooperative with normal mood, affect, and cognition    Laboratory Values:   No results found for this or any previous visit (from the past 24 hour(s)). CT PELV WO CONT   Final Result   1.   There is a distended trabeculated bladder consistent with bladder outlet   obstruction versus neurogenic changes of the bladder. 2.  There is chronic diverticulosis of the sigmoid colon. There is an   extraluminal air collection with a smaller component of fluid along the   posterior wall of the mid sigmoid colon. This extraluminal collection was not   opacified with the oral contrast material as the oral contrast material   progressed into the sigmoid colon. CT ABD PELV W CONT   Final Result   Reidentification of changes of presumed diverticulitis in the   sigmoid colon. Enteric contrast that was administered did not adequately   challenge the area in question and a contained perforation is not excluded by   this exam.. Multiple other findings as above including dilated biliary ducts,   renal cysts, calcified uterine fibroids      A HIPPA compliant text regarding the findings and/or need for follow-up as   described in this report was sent to [facility] via the Perfect Serve   application at the time of this interpretation. CT ABD PELV W CONT   Final Result   Findings most concerning for diverticulitis involving the sigmoid colon with   localized/microscopic perforation. No free air or drainable abscess. Given   patient age, this warrants follow-up with colonoscopy after acute episode. Mild dilatation of the common duct and the pancreatic duct without discrete mass   seen in the pancreatic head. Obstruction at the ampullar cannot be excluded. Recommend correlation with MRCP with and without contrast, or ERCP. COMMUNICATION:   The result was discussed with Dr. Fatoumata Gardner. Assessment:  Problem List Items Addressed This Visit     None      Visit Diagnoses     Abdominal pain, LLQ (left lower quadrant)    -  Primary    Diverticulitis           Resolving Diverticulitis    Urinary retention secondary bladder outlet obstruction    Plan:    1. Admission  2. Diet: Clears & advance as tolerated. 3. IV fluids  4. SCD  5. IS  6.  Pain medications  7. Antibiotics  8. Nausea medication  9. Cordero for urinary retention & bladder outlet obstruction  10. Labs & Radiology in am  11. Plan discussed with patient and family and answered all their questions. Thank you for allowing me to participate in the care of this patient.

## 2022-05-21 NOTE — PROGRESS NOTES
Chief Complaint:      Subjective:  Ms. Liset Martin is a 80y.o. year old * female admitted for sigmoid diverticulitis with microperforation. Still has some lower abdominal pain, no fever or chills, no nausea or vomiting. CT scan of abdomen and pelvis with PO contrast shows resolution of diverticulitis and no extravasation of PO contrast in to the large diverticulum located in pelvis. However the bladder is pretty distended consistent with retention of urine inspite of purewick. Had a BM yesterday. Tolerating full liquids. Review of Systems:   Constitutional:  no fever, no chills,  no sweats, No weakness, No fatigue, No decreased activity. Respiratory: No shortness of breath, No cough, No sputum production, No hemoptysis, No wheezing, No cyanosis. Cardiovascular: No chest pain, No palpitations, No bradycardia, No tachycardia, No peripheral edema, No syncope. Gastrointestinal: No nausea, No vomiting, No diarrhea, No constipation, No heartburn,  abdominal pain. Genitourinary: No dysuria, No hematuria, No change in urine stream, No urethral discharge, No lesions. Hematology/Lymphatics: No bruising tendency, No bleeding tendency, No petechiae, No swollen lymph glands. Endocrine: No excessive thirst, No polyuria, No cold intolerance, No heat intolerance, No excessive hunger. Musculoskeletal: No back pain, No neck pain, No joint pain, No muscle pain, No claudication, No decreased range of motion, No trauma. Integumentary: No rash, No pruritus, No abrasions. Neurologic: Alert and oriented X4, No abnormal balance, No headache, No confusion, No numbness, No tingling. Psychiatric: No anxiety, No depression, No ruben. Physical Exam:     Vitals & Measurements:     Wt Readings from Last 3 Encounters:   05/19/22 68 kg (149 lb 14.6 oz)   01/25/22 71.2 kg (156 lb 15.5 oz)   09/01/12 71.2 kg (157 lb)     Temp Readings from Last 3 Encounters:   05/21/22 98.3 °F (36.8 °C)   01/25/22 97.7 °F (36.5 °C) 07/07/20 98.5 °F (36.9 °C)     BP Readings from Last 3 Encounters:   05/21/22 (!) 143/81   01/25/22 115/66   07/07/20 159/53     Pulse Readings from Last 3 Encounters:   05/21/22 92   01/25/22 86   07/07/20 63      Ht Readings from Last 3 Encounters:   05/19/22 5' 1.02\" (1.55 m)   09/01/12 5' 1\" (1.549 m)      Date 05/20/22 0700 - 05/21/22 0659 05/21/22 0700 - 05/22/22 0659   Shift 9736-3544 6084-1918 24 Hour Total 1506-5222 0024-4730 24 Hour Total   INTAKE   Shift Total(mL/kg)         OUTPUT   Urine(mL/kg/hr) 300(0.4) 1500(1.8) 1800(1.1)        Urine Voided 300 500 800        Urine Occurrence(s) 1 x  1 x        Urine Output (mL) (Urinary Catheter 05/21/22 2- way)  1000 1000      Shift Total(mL/kg) 300(4.4) 1500(22.1) 1800(26.5)      NET -300 -1500 -1800      Weight (kg) 68 68 68 68 68 68       General: well appearing, no acute distress  Head: Normal  Face: Nornal  HEENT: atraumatic, PERRLA, moist mucosa, normal pharynx, normal tonsils and adenoids, normal tongue, no fluid in sinuses  Neck: Trachea midline, no carotid bruit, no masses  Chest: Normal.  Respiratory: normal chest wall expansion, CTA B, no r/r/w, no rubs  Cardiovascular: RRR, no m/r/g, Normal S1 and S2  Abdomen: Soft, mild suprapubic tenderness, no guarding or rebound, non-distended, normal bowel sounds in all quadrants, no hepatosplenomegaly, no tympany. Incision scar: none  Genitourinary: No inguinal hernia, has hart, normal external gentalia, no renal angle tenderness  Rectal: deferred  Musculoskeletal: Normal ROM in upper and lower extremities, No joint swelling. Integumentary: Warm, dry, and pink, with no rash, purpura, or petechia  Heme/Lymph: No lymphadenopathy, no bruises  Neurological: Cranial Nerves II-XII grossly intact, No gross sensory or motor deficit.   Psychiatric: Cooperative with normal mood, affect, and cognition    Laboratory Values:   Recent Results (from the past 24 hour(s))   URINALYSIS W/MICROSCOPIC    Collection Time: 05/21/22  2:00 AM   Result Value Ref Range    Color Yellow/Straw      Appearance Clear Clear      Specific gravity 1.024 1.003 - 1.030      pH (UA) 5.0 5.0 - 8.0      Protein 30 (A) Negative mg/dL    Glucose Negative Negative mg/dL    Ketone 5 (A) Negative mg/dL    Bilirubin Negative Negative      Blood Moderate (A) Negative      Urobilinogen 0.1 0.1 - 1.0 EU/dL    Nitrites Negative Negative      Leukocyte Esterase Trace (A) Negative      WBC 20-50 0 - 4 /hpf    RBC  0 - 5 /hpf    Bacteria Negative Negative /hpf    Mucus 2+ (A) Negative /lpf    Budding yeast Present (A) Negative           CT PELV WO CONT   Final Result   1. There is a distended trabeculated bladder consistent with bladder outlet   obstruction versus neurogenic changes of the bladder. 2.  There is chronic diverticulosis of the sigmoid colon. There is an   extraluminal air collection with a smaller component of fluid along the   posterior wall of the mid sigmoid colon. This extraluminal collection was not   opacified with the oral contrast material as the oral contrast material   progressed into the sigmoid colon. CT ABD PELV W CONT   Final Result   Reidentification of changes of presumed diverticulitis in the   sigmoid colon. Enteric contrast that was administered did not adequately   challenge the area in question and a contained perforation is not excluded by   this exam.. Multiple other findings as above including dilated biliary ducts,   renal cysts, calcified uterine fibroids      A HIPPA compliant text regarding the findings and/or need for follow-up as   described in this report was sent to [facility] via the Perfect Serve   application at the time of this interpretation. CT ABD PELV W CONT   Final Result   Findings most concerning for diverticulitis involving the sigmoid colon with   localized/microscopic perforation. No free air or drainable abscess.  Given   patient age, this warrants follow-up with colonoscopy after acute episode. Mild dilatation of the common duct and the pancreatic duct without discrete mass   seen in the pancreatic head. Obstruction at the ampullar cannot be excluded. Recommend correlation with MRCP with and without contrast, or ERCP. COMMUNICATION:   The result was discussed with Dr. Melodie Garcia. Assessment:  Problem List Items Addressed This Visit     None      Visit Diagnoses     Abdominal pain, LLQ (left lower quadrant)    -  Primary    Diverticulitis           Resolving Diverticulitis    Urinary retention secondary bladder outlet obstruction S/P hart placement    Plan:    1. Admission  2. Diet: advance as tolerated. 3. IV fluids  4. SCD  5. IS  6. Pain medications  7. Antibiotics  8. Nausea medication  9. Hart for urinary retention & bladder outlet obstruction & follow up with Urologist as outpatient  10. If tolerates diet may d/c home. 11. Plan discussed with patient and family and answered all their questions. Thank you for allowing me to participate in the care of this patient.

## 2022-05-22 ENCOUNTER — APPOINTMENT (OUTPATIENT)
Dept: CT IMAGING | Age: 87
DRG: 392 | End: 2022-05-22
Attending: HOSPITALIST
Payer: MEDICARE

## 2022-05-22 LAB
ANION GAP SERPL CALC-SCNC: 8 MMOL/L (ref 5–15)
BACTERIA SPEC CULT: NORMAL
BASOPHILS # BLD: 0 K/UL (ref 0–0.1)
BASOPHILS NFR BLD: 0 % (ref 0–1)
BUN SERPL-MCNC: 23 MG/DL (ref 6–20)
BUN/CREAT SERPL: 21 (ref 12–20)
CA-I BLD-MCNC: 8.7 MG/DL (ref 8.5–10.1)
CHLORIDE SERPL-SCNC: 105 MMOL/L (ref 97–108)
CO2 SERPL-SCNC: 26 MMOL/L (ref 21–32)
CREAT SERPL-MCNC: 1.11 MG/DL (ref 0.55–1.02)
DIFFERENTIAL METHOD BLD: ABNORMAL
EOSINOPHIL # BLD: 0 K/UL (ref 0–0.4)
EOSINOPHIL NFR BLD: 0 % (ref 0–7)
ERYTHROCYTE [DISTWIDTH] IN BLOOD BY AUTOMATED COUNT: 14.6 % (ref 11.5–14.5)
GLUCOSE SERPL-MCNC: 119 MG/DL (ref 65–100)
HCT VFR BLD AUTO: 34.6 % (ref 35–47)
HGB BLD-MCNC: 11.1 G/DL (ref 11.5–16)
IMM GRANULOCYTES # BLD AUTO: 0 K/UL
IMM GRANULOCYTES NFR BLD AUTO: 0 %
LYMPHOCYTES # BLD: 2.2 K/UL (ref 0.8–3.5)
LYMPHOCYTES NFR BLD: 17 % (ref 12–49)
MCH RBC QN AUTO: 29.4 PG (ref 26–34)
MCHC RBC AUTO-ENTMCNC: 32.1 G/DL (ref 30–36.5)
MCV RBC AUTO: 91.8 FL (ref 80–99)
MONOCYTES # BLD: 0.1 K/UL (ref 0–1)
MONOCYTES NFR BLD: 1 % (ref 5–13)
NEUTS BAND NFR BLD MANUAL: 19 % (ref 0–6)
NEUTS SEG # BLD: 10.9 K/UL (ref 1.8–8)
NEUTS SEG NFR BLD: 63 % (ref 32–75)
NRBC # BLD: 0 K/UL (ref 0–0.01)
NRBC BLD-RTO: 0 PER 100 WBC
PLATELET # BLD AUTO: 164 K/UL (ref 150–400)
PMV BLD AUTO: 10.9 FL (ref 8.9–12.9)
POTASSIUM SERPL-SCNC: 3.7 MMOL/L (ref 3.5–5.1)
RBC # BLD AUTO: 3.77 M/UL (ref 3.8–5.2)
RBC MORPH BLD: ABNORMAL
SODIUM SERPL-SCNC: 139 MMOL/L (ref 136–145)
SPECIAL REQUESTS,SREQ: NORMAL
WBC # BLD AUTO: 13.2 K/UL (ref 3.6–11)

## 2022-05-22 PROCEDURE — 85025 COMPLETE CBC W/AUTO DIFF WBC: CPT

## 2022-05-22 PROCEDURE — 74011250637 HC RX REV CODE- 250/637: Performed by: INTERNAL MEDICINE

## 2022-05-22 PROCEDURE — 74178 CT ABD&PLV WO CNTR FLWD CNTR: CPT

## 2022-05-22 PROCEDURE — 74011000258 HC RX REV CODE- 258: Performed by: INTERNAL MEDICINE

## 2022-05-22 PROCEDURE — 65270000029 HC RM PRIVATE

## 2022-05-22 PROCEDURE — 74011250636 HC RX REV CODE- 250/636: Performed by: INTERNAL MEDICINE

## 2022-05-22 PROCEDURE — 36415 COLL VENOUS BLD VENIPUNCTURE: CPT

## 2022-05-22 PROCEDURE — 74011250637 HC RX REV CODE- 250/637: Performed by: HOSPITALIST

## 2022-05-22 PROCEDURE — 80048 BASIC METABOLIC PNL TOTAL CA: CPT

## 2022-05-22 PROCEDURE — 74011000636 HC RX REV CODE- 636: Performed by: HOSPITALIST

## 2022-05-22 PROCEDURE — 74011250636 HC RX REV CODE- 250/636: Performed by: HOSPITALIST

## 2022-05-22 PROCEDURE — 74011000250 HC RX REV CODE- 250: Performed by: INTERNAL MEDICINE

## 2022-05-22 RX ORDER — FLUCONAZOLE 2 MG/ML
200 INJECTION, SOLUTION INTRAVENOUS DAILY
Status: DISCONTINUED | OUTPATIENT
Start: 2022-05-22 | End: 2022-05-23 | Stop reason: HOSPADM

## 2022-05-22 RX ORDER — MAGNESIUM SULFATE HEPTAHYDRATE 40 MG/ML
2 INJECTION, SOLUTION INTRAVENOUS ONCE
Status: COMPLETED | OUTPATIENT
Start: 2022-05-22 | End: 2022-05-23

## 2022-05-22 RX ORDER — MORPHINE SULFATE 2 MG/ML
1 INJECTION, SOLUTION INTRAMUSCULAR; INTRAVENOUS
Status: DISCONTINUED | OUTPATIENT
Start: 2022-05-22 | End: 2022-05-22

## 2022-05-22 RX ORDER — HYDROMORPHONE HYDROCHLORIDE 1 MG/ML
0.5 INJECTION, SOLUTION INTRAMUSCULAR; INTRAVENOUS; SUBCUTANEOUS
Status: DISCONTINUED | OUTPATIENT
Start: 2022-05-22 | End: 2022-05-23 | Stop reason: HOSPADM

## 2022-05-22 RX ADMIN — SODIUM CHLORIDE, PRESERVATIVE FREE 10 ML: 5 INJECTION INTRAVENOUS at 01:18

## 2022-05-22 RX ADMIN — Medication 1 TABLET: at 12:50

## 2022-05-22 RX ADMIN — SODIUM CHLORIDE 75 ML/HR: 9 INJECTION, SOLUTION INTRAVENOUS at 12:48

## 2022-05-22 RX ADMIN — HYDROMORPHONE HYDROCHLORIDE 0.5 MG: 1 INJECTION, SOLUTION INTRAMUSCULAR; INTRAVENOUS; SUBCUTANEOUS at 23:21

## 2022-05-22 RX ADMIN — ASPIRIN 81 MG: 81 TABLET, COATED ORAL at 12:50

## 2022-05-22 RX ADMIN — ENOXAPARIN SODIUM 30 MG: 100 INJECTION SUBCUTANEOUS at 12:49

## 2022-05-22 RX ADMIN — PIPERACILLIN SODIUM AND TAZOBACTAM SODIUM 3.38 G: 3; .375 INJECTION, POWDER, LYOPHILIZED, FOR SOLUTION INTRAVENOUS at 12:49

## 2022-05-22 RX ADMIN — PIPERACILLIN SODIUM AND TAZOBACTAM SODIUM 3.38 G: 3; .375 INJECTION, POWDER, LYOPHILIZED, FOR SOLUTION INTRAVENOUS at 01:18

## 2022-05-22 RX ADMIN — MAGNESIUM SULFATE HEPTAHYDRATE 2 G: 40 INJECTION, SOLUTION INTRAVENOUS at 15:04

## 2022-05-22 RX ADMIN — SODIUM CHLORIDE, PRESERVATIVE FREE 10 ML: 5 INJECTION INTRAVENOUS at 05:37

## 2022-05-22 RX ADMIN — Medication 1000 UNITS: at 12:50

## 2022-05-22 RX ADMIN — DIATRIZOATE MEGLUMINE AND DIATRIZOATE SODIUM 30 ML: 660; 100 LIQUID ORAL; RECTAL at 17:38

## 2022-05-22 RX ADMIN — HYDROMORPHONE HYDROCHLORIDE 0.5 MG: 1 INJECTION, SOLUTION INTRAMUSCULAR; INTRAVENOUS; SUBCUTANEOUS at 12:49

## 2022-05-22 RX ADMIN — LOSARTAN POTASSIUM 100 MG: 50 TABLET, FILM COATED ORAL at 12:50

## 2022-05-22 RX ADMIN — PIPERACILLIN SODIUM AND TAZOBACTAM SODIUM 3.38 G: 3; .375 INJECTION, POWDER, LYOPHILIZED, FOR SOLUTION INTRAVENOUS at 21:12

## 2022-05-22 RX ADMIN — FLUCONAZOLE 200 MG: 2 INJECTION, SOLUTION INTRAVENOUS at 16:49

## 2022-05-22 RX ADMIN — CYANOCOBALAMIN TAB 500 MCG 500 MCG: 500 TAB at 12:50

## 2022-05-22 RX ADMIN — SODIUM CHLORIDE, PRESERVATIVE FREE 10 ML: 5 INJECTION INTRAVENOUS at 21:12

## 2022-05-22 RX ADMIN — LEVOTHYROXINE SODIUM 75 MCG: 0.07 TABLET ORAL at 12:52

## 2022-05-22 RX ADMIN — PANTOPRAZOLE SODIUM 40 MG: 40 TABLET, DELAYED RELEASE ORAL at 12:50

## 2022-05-22 RX ADMIN — AMLODIPINE BESYLATE 5 MG: 5 TABLET ORAL at 12:50

## 2022-05-22 NOTE — PROGRESS NOTES
Chief Complaint: abdominal pain      Subjective:  Ms. Jarad Pratt is a 80y.o. year old  female admitted for sigmoid diverticulitis with microperforation. Has been complaining of increasing abdominal pain since last night, no fever or chills, no nausea or vomiting. Acute abdominal series yesterday was suggestive of ileus. She was kept NPO & narcotics were withheld. Today she has been experiencing more abdominal pain. Review of Systems:   Constitutional:  no fever, no chills,  no sweats, No weakness, No fatigue, No decreased activity. Respiratory: No shortness of breath, No cough, No sputum production, No hemoptysis, No wheezing, No cyanosis. Cardiovascular: No chest pain, No palpitations, No bradycardia, No tachycardia, No peripheral edema, No syncope. Gastrointestinal: No nausea, No vomiting, No diarrhea, No constipation, No heartburn,  abdominal pain. Genitourinary: No dysuria, No hematuria, No change in urine stream, No urethral discharge, No lesions. Hematology/Lymphatics: No bruising tendency, No bleeding tendency, No petechiae, No swollen lymph glands. Endocrine: No excessive thirst, No polyuria, No cold intolerance, No heat intolerance, No excessive hunger. Musculoskeletal: No back pain, No neck pain, No joint pain, No muscle pain, No claudication, No decreased range of motion, No trauma. Integumentary: No rash, No pruritus, No abrasions. Neurologic: Alert and oriented X4, No abnormal balance, No headache, No confusion, No numbness, No tingling. Psychiatric: No anxiety, No depression, No ruben. Physical Exam:     Vitals & Measurements:     Wt Readings from Last 3 Encounters:   05/19/22 68 kg (149 lb 14.6 oz)   01/25/22 71.2 kg (156 lb 15.5 oz)   09/01/12 71.2 kg (157 lb)     Temp Readings from Last 3 Encounters:   05/22/22 97.9 °F (36.6 °C)   01/25/22 97.7 °F (36.5 °C)   07/07/20 98.5 °F (36.9 °C)     BP Readings from Last 3 Encounters:   05/22/22 139/69   01/25/22 115/66   07/07/20 159/53     Pulse Readings from Last 3 Encounters:   05/22/22 75   01/25/22 86   07/07/20 63      Ht Readings from Last 3 Encounters:   05/19/22 5' 1.02\" (1.55 m)   09/01/12 5' 1\" (1.549 m)      Date 05/21/22 0700 - 05/22/22 0659 05/22/22 0700 - 05/23/22 0659   Shift 9667-5606 2420-6721 24 Hour Total 7034-4372 2211-0378 24 Hour Total   INTAKE   Shift Total(mL/kg)         OUTPUT   Urine(mL/kg/hr) 900(1.1) 600(0.7) 1500(0.9)        Urine Output (mL) (Urinary Catheter 05/21/22 2- way)       Shift Total(mL/kg) 900(13.2) 600(8.8) 1500(22.1)      NET -900 -600 -1500      Weight (kg) 68 68 68 68 68 68       General: well appearing, no acute distress  Head: Normal  Face: Nornal  HEENT: atraumatic, PERRLA, moist mucosa, normal pharynx, normal tonsils and adenoids, normal tongue, no fluid in sinuses  Neck: Trachea midline, no carotid bruit, no masses  Chest: Normal.  Respiratory: normal chest wall expansion, CTA B, no r/r/w, no rubs  Cardiovascular: RRR, no m/r/g, Normal S1 and S2  Abdomen: Soft, suprapubic & bilateral lower quadrant tenderness,  Mildly distended, normal bowel sounds in all quadrants, no hepatosplenomegaly, no tympany. Incision scar: none  Genitourinary: No inguinal hernia, has hart, normal external gentalia, no renal angle tenderness  Rectal: deferred  Musculoskeletal: Normal ROM in upper and lower extremities, No joint swelling. Integumentary: Warm, dry, and pink, with no rash, purpura, or petechia  Heme/Lymph: No lymphadenopathy, no bruises  Neurological: Cranial Nerves II-XII grossly intact, No gross sensory or motor deficit. Psychiatric: Cooperative with normal mood, affect, and cognition    Laboratory Values:   No results found for this or any previous visit (from the past 24 hour(s)). XR ABD ACUTE W 1 V CHEST   Final Result   1. Left base atelectasis and less likely pneumonia, infarct or other. 2. Diffuse ileus and unlikely mechanical obstruction.       CT PELV WO CONT   Final Result   1. There is a distended trabeculated bladder consistent with bladder outlet   obstruction versus neurogenic changes of the bladder. 2.  There is chronic diverticulosis of the sigmoid colon. There is an   extraluminal air collection with a smaller component of fluid along the   posterior wall of the mid sigmoid colon. This extraluminal collection was not   opacified with the oral contrast material as the oral contrast material   progressed into the sigmoid colon. CT ABD PELV W CONT   Final Result   Reidentification of changes of presumed diverticulitis in the   sigmoid colon. Enteric contrast that was administered did not adequately   challenge the area in question and a contained perforation is not excluded by   this exam.. Multiple other findings as above including dilated biliary ducts,   renal cysts, calcified uterine fibroids      A HIPPA compliant text regarding the findings and/or need for follow-up as   described in this report was sent to [facility] via the Perfect Serve   application at the time of this interpretation. CT ABD PELV W CONT   Final Result   Findings most concerning for diverticulitis involving the sigmoid colon with   localized/microscopic perforation. No free air or drainable abscess. Given   patient age, this warrants follow-up with colonoscopy after acute episode. Mild dilatation of the common duct and the pancreatic duct without discrete mass   seen in the pancreatic head. Obstruction at the ampullar cannot be excluded. Recommend correlation with MRCP with and without contrast, or ERCP. COMMUNICATION:   The result was discussed with Dr. Fatoumata Gardner.           Assessment:  Problem List Items Addressed This Visit     None      Visit Diagnoses     Abdominal pain, LLQ (left lower quadrant)    -  Primary    Diverticulitis           Diverticulitis    Urinary retention secondary bladder outlet obstruction S/P hart placement    Plan:    1. Admission  2. Diet: NPO  3. IV fluids  4. SCD  5. IS  6. Pain medications  7. Resume Antibiotics  8. Nausea medication  9. Cordero for urinary retention & bladder outlet obstruction & follow up with Urologist as outpatient  10. Stat CT scan of abdomen and pelvis with PO & IV contrast.  11. Plan discussed with patient and family and answered all their questions. Thank you for allowing me to participate in the care of this patient.

## 2022-05-22 NOTE — PROGRESS NOTES
Physician Progress Note      Liana Ghotra  [facility] #:                  [id number]  :                       3/20/1925  ADMIT DATE:       2022 12:43 PM  [address] [place] DATE:  RESPONDING  PROVIDER #:        Karla Cole MD          QUERY TEXT:    Pt admitted with abdominal pain. Noted documentation of moderate malnutrition in  Dietician consult note. If possible, please document in progress notes and discharge summary:      The medical record reflects the following:  Risk Factors: 80year old female, abdominal pain, decreased intake PTA 2/2 food preferences, BMI 28.3, Albumin 3.2-3.1  Clinical Indicators:  Dietician note - Moderate malnutrition, Acute illness  Body Fat Loss: Moderate body fat loss, Triceps,Orbital  Muscle Mass Loss:  Mild muscle mass loss, Calf  -Inadequate oral intake related to altered GI function,other (specify) (food preference) as evidenced by poor intake prior to admission,weight loss,moderate loss of subcutaneous fat,mild muscle loss,Criteria as identified in malnutrition assessment  Treatment: 1. NPO, advance to Low Fiber diet when medically appropriate for PO.  2. When diet starts, Ensure compact x2/d(440 kcal, 18 gm PRO). 3. Monitor while IP. Please email [email or web address] with any questions  Options provided:  -- moderate malnutrition confirmed present on admission  -- moderate malnutrition ruled out  -- Other - I will add my own diagnosis  -- Disagree - Not applicable / Not valid  -- Disagree - Clinically unable to determine / Unknown  -- Refer to Clinical Documentation Reviewer    PROVIDER RESPONSE TEXT:    The diagnosis of moderate malnutrition was confirmed as present on admission. Query created by:  Oumar Alfredo on 2022 10:16 AM      Electronically signed by:  Karla Cole MD 2022 1:50 PM

## 2022-05-22 NOTE — ROUTINE PROCESS
I just got results from abd xray and will  Call  Dr. Ke Calvo. I listened to active bowel sounds all over abdomen and also got Vero Pate to also listen which she said she heard bowel sounds all over also.

## 2022-05-22 NOTE — ROUTINE PROCESS
Dr. Yareli Bhagat called me back and said to discontinue the Dilaudid iv and give Toradol 15 mg iv every 6 hrs no narcotics and keep npo and he will  See her in am. I gave him the results of abdomen xray.

## 2022-05-22 NOTE — PROGRESS NOTES
Disc with Dr Corinna Lam that pt had made comments saying she could not go on living like this and that she was old and tired, pt also verbalizes that she does have family that cares for her, per daughter she had made those comments before but never expressed a plan or any intent, and no signs of actual intent or plan noted, continue to monitor

## 2022-05-22 NOTE — ROUTINE PROCESS
I got Dr. Angie Gil voice mail and left vague msg to call me about the stat xray he ordered so I also had him paged  For results

## 2022-05-22 NOTE — PROGRESS NOTES
Progress Note    Patient: Farnaz Bennett MRN: [id number]  SSN: [id number]    YOB: 1925  Age: 80 y.o. Sex: female      Admit Date: 5/18/2022    LOS: 4 days     Subjective:     89F< with sigmoid diverticulitis with microperforation. HOSPITAL COURSE:   89F with past medical history for arthritis, hypertension, hypothyroidism, recent falls, lives in a womens home because of physical debility. 5/18/22 presents to hospital because of abdominal pains. Development over the previous one month. Primarily in the left lower quadrant. Crampy and intermittent. Worsens with movement. Associated with nausea. During the ED course CT imaging suggestive of sigmoid diverticulitis with microperforation. Managed with fluids and broad spectrum antibiotics . Abdominal pain better today, will allow clear liquid diet today and plan for dc tomorrow is tolerated, however on 5/21 after lunch she developed severe abdominal pain CXR showed ileus. She was kept NPO, IVF and code changed to DNR DNI after discussion with her and daughter       Review of Systems:  Constitutional:  denies weight loss, no fever, no chills, no night sweats  Eye: No recent visual disturbances, no discharge, no double vision  Ear/nose/mouth/throat : No hearing disturbance, no ear pain, no nasal congestion, no sore throat, no trouble swallowing. Respiratory : No trouble breathing, no cough, no shortness of breath, no hemoptysis, no wheezing  Cardiovascular : No chest pain, no palpitation, no racing of heart, no orthopnea, no paroxysmal nocturnal dyspnea, no peripheral edema  Gastrointestinal : ++++  nausea, +++  vomiting, no diarrhea, constipation, heartburn, ++++++ abdominal pain  Genitourinary : No dysuria, no hematuria, no increased frequency, incontinence,  Lymphatics : No swollen glands -Neck, axillary, inguinal  Endocrine : No excessive thirst, no polyuria no cold intolerance, no heat intolerance.   Immunologic : No hives, urticaria, no seasonal allergies,   Musculoskeletal : Left upper back pain. No joint swelling, pain, effusion,  no neck pain,   Integumentary : No rash, no pruritus, no ecchymosis  Hematology : No petechiae, No easy bruising,  No tendency to bleed easy  Neurology : Denies change in mental status, no abnormal balance, no headache, no confusion, numbness, tingling,  Psychiatric : No mood swings, no anxiety, depression      Objective:     Vitals:    05/21/22 1907 05/21/22 2352 05/22/22 0336 05/22/22 0841   BP: (!) 154/72 125/62 (!) 106/48 139/69   Pulse: 94 90 81 75   Resp: 18 18 18 18   Temp: 98.2 °F (36.8 °C) 98.6 °F (37 °C) 98.9 °F (37.2 °C) 97.9 °F (36.6 °C)   SpO2: 93% 95% 96% 95%   Weight:       Height:            Intake and Output:  Current Shift: No intake/output data recorded. Last three shifts: 05/20 1901 - 05/22 0700  In: -   Out: 3000 [Urine:3000]      Physical Exam:      Physical Exam  Vitals and nursing note reviewed. HENT:      Head: Normocephalic and atraumatic. Nose: Nose normal.      Mouth/Throat:      Mouth: Mucous membranes are dry. Eyes:      Extraocular Movements: Extraocular movements intact. Pupils: Pupils are equal, round, and reactive to light. Cardiovascular:      Rate and Rhythm: Normal rate and regular rhythm. Pulses: Normal pulses. Heart sounds: Normal heart sounds. Pulmonary:      Effort: Pulmonary effort is normal.      Breath sounds: Normal breath sounds. Abdominal:      General: Abdomen is flat. Palpations: Abdomen is soft. Tenderness: There is abdominal tenderness. There is no guarding or rebound. Comments: LLQ tenderness   Musculoskeletal:      Cervical back: Normal range of motion and neck supple. Skin:     General: Skin is dry. Neurological:      General: No focal deficit present. Mental Status: She is alert. Mental status is at baseline.    Psychiatric:         Mood and Affect: Mood normal.          Lab/Data Review:  No results found for this or any previous visit (from the past 24 hour(s)). Assessment and plan:      (1) Sigmoid diverticulitis with microperforation: NPO, zosyn, IVF, pain controlled on tylenol. PRN zofran    (2) HTN: continue amlodipine    (3) hypothyroidism: levothyroxine    (4) ileus: npo, IVF, dilaudid for pain.      DVT ppx: lovenox     DISPO: will likely go to [facility] tomorrow if abdominal pain better and tolerating orally, and cleared by surgery    Signed By: Rad Green MD     May 22, 2022

## 2022-05-23 ENCOUNTER — TELEPHONE (OUTPATIENT)
Dept: UROLOGY | Age: 87
End: 2022-05-23

## 2022-05-23 VITALS
HEIGHT: 61 IN | OXYGEN SATURATION: 96 % | HEART RATE: 84 BPM | DIASTOLIC BLOOD PRESSURE: 73 MMHG | SYSTOLIC BLOOD PRESSURE: 160 MMHG | RESPIRATION RATE: 21 BRPM | TEMPERATURE: 97.8 F | BODY MASS INDEX: 28.3 KG/M2 | WEIGHT: 149.91 LBS

## 2022-05-23 PROCEDURE — 74011250636 HC RX REV CODE- 250/636: Performed by: INTERNAL MEDICINE

## 2022-05-23 PROCEDURE — 74011000258 HC RX REV CODE- 258: Performed by: INTERNAL MEDICINE

## 2022-05-23 PROCEDURE — 74011250636 HC RX REV CODE- 250/636: Performed by: HOSPITALIST

## 2022-05-23 PROCEDURE — 74011000250 HC RX REV CODE- 250: Performed by: INTERNAL MEDICINE

## 2022-05-23 RX ORDER — LORAZEPAM 2 MG/ML
1 INJECTION INTRAMUSCULAR
Status: DISCONTINUED | OUTPATIENT
Start: 2022-05-23 | End: 2022-05-23 | Stop reason: HOSPADM

## 2022-05-23 RX ORDER — FLUCONAZOLE 200 MG/1
200 TABLET ORAL DAILY
Qty: 10 TABLET | Refills: 0 | Status: SHIPPED | OUTPATIENT
Start: 2022-05-23 | End: 2022-06-02

## 2022-05-23 RX ORDER — MORPHINE SULFATE 2 MG/ML
2 INJECTION, SOLUTION INTRAMUSCULAR; INTRAVENOUS
Status: DISCONTINUED | OUTPATIENT
Start: 2022-05-23 | End: 2022-05-23 | Stop reason: HOSPADM

## 2022-05-23 RX ADMIN — FLUCONAZOLE 200 MG: 2 INJECTION, SOLUTION INTRAVENOUS at 10:06

## 2022-05-23 RX ADMIN — SODIUM CHLORIDE, PRESERVATIVE FREE 10 ML: 5 INJECTION INTRAVENOUS at 06:29

## 2022-05-23 RX ADMIN — MORPHINE SULFATE 2 MG: 2 INJECTION, SOLUTION INTRAMUSCULAR; INTRAVENOUS at 12:10

## 2022-05-23 RX ADMIN — HYDROMORPHONE HYDROCHLORIDE 0.5 MG: 1 INJECTION, SOLUTION INTRAMUSCULAR; INTRAVENOUS; SUBCUTANEOUS at 09:40

## 2022-05-23 RX ADMIN — PIPERACILLIN SODIUM AND TAZOBACTAM SODIUM 3.38 G: 3; .375 INJECTION, POWDER, LYOPHILIZED, FOR SOLUTION INTRAVENOUS at 14:00

## 2022-05-23 RX ADMIN — SODIUM CHLORIDE, PRESERVATIVE FREE 10 ML: 5 INJECTION INTRAVENOUS at 14:38

## 2022-05-23 RX ADMIN — PIPERACILLIN SODIUM AND TAZOBACTAM SODIUM 3.38 G: 3; .375 INJECTION, POWDER, LYOPHILIZED, FOR SOLUTION INTRAVENOUS at 06:27

## 2022-05-23 RX ADMIN — IOPAMIDOL 100 ML: 755 INJECTION, SOLUTION INTRAVENOUS at 00:00

## 2022-05-23 RX ADMIN — HYDROMORPHONE HYDROCHLORIDE 0.5 MG: 1 INJECTION, SOLUTION INTRAMUSCULAR; INTRAVENOUS; SUBCUTANEOUS at 15:48

## 2022-05-23 RX ADMIN — ENOXAPARIN SODIUM 30 MG: 100 INJECTION SUBCUTANEOUS at 10:06

## 2022-05-23 RX ADMIN — ONDANSETRON 4 MG: 2 INJECTION INTRAMUSCULAR; INTRAVENOUS at 12:26

## 2022-05-23 NOTE — PROGRESS NOTES
Comprehensive Nutrition Assessment    Type and Reason for Visit: Reassess (Interim)    Nutrition Recommendations/Plan:   1. NPO, pleasure foods as appropriate to goals of care. Nutrition sign-off. Pt on Hospice/comfort care measures. Malnutrition Assessment:  Malnutrition Status: Moderate malnutrition (05/19/22 1410)    Context:  Acute illness       Nutrition Assessment:    Admitted for abdominal pain x1 mth 2/2 diverticulitis w/ microperforation. NPO since admission for sx today per Pt. Reported decreased intake PTA 2/2 food preferences (cultural food preferences). UBW~150 lbs, LPH=686.5 lbs at bedside today. Consider Low Fiber diet when PO diet appropriate and add ONS.(5/23)NPO 2/2 declined bowel resection sx for perforated bowel. Ice chips provided for comfort per Nsg. RD rec's continuing comfort measures and will sign off. Nutrition Related Findings:    No N/V/D/C nor c/s issues reported per Pt, Nsg. Last BM 5/21. No edema noted. Wound Type: None    Current Nutrition Intake & Therapies:  Average Meal Intake: NPO  Average Supplement Intake: NPO  DIET NPO    Anthropometric Measures:  Height: 5' 1.02\" (155 cm)  Ideal Body Weight (IBW): 105 lbs (48 kg)  Current Body Wt:  67.4 kg (148 lb 8 oz) (5/19, RD obtained.), 141.4 % IBW. Bed scale  Current BMI (kg/m2): 28  Usual Body Weight: 68 kg (150 lb)  % Weight Change (Calculated): -1  Weight Adjustment: No adjustment   BMI Category: Overweight (BMI 25.0-29. 9)    Estimated Daily Nutrient Needs:  Energy Requirements Based On: Kcal/kg  Weight Used for Energy Requirements: Current  Energy (kcal/day): 1685 kcal/d  Weight Used for Protein Requirements: Current  Protein (g/day): 75 gm/d  Method Used for Fluid Requirements: 1 ml/kcal  Fluid (ml/day): 1700 mL/d    Nutrition Diagnosis:   · Inadequate oral intake related to altered GI function,other (specify) (food preference) as evidenced by poor intake prior to admission,weight loss,moderate loss of subcutaneous fat,mild muscle loss,Criteria as identified in malnutrition assessment    Nutrition Interventions:   Food and/or Nutrient Delivery: Continue NPO  Nutrition Education/Counseling: Education not indicated  Coordination of Nutrition Care: No recommendation at this time  Plan of Care discussed with: RN. Goals:  Previous Goal Met: No progress toward goal(s)  Goals: other (specify)  Specify Other Goals: No goals. Nutrition Monitoring and Evaluation:   Behavioral-Environmental Outcomes: None identified  Food/Nutrient Intake Outcomes: Other (specify) (Comfort/pleasure meals as appropriate to goals of care)  Physical Signs/Symptoms Outcomes: None identified    Discharge Planning:    No discharge needs at this time    Katie Trevizo RD  Contact: ext. . [phone] or [employer].

## 2022-05-23 NOTE — PROGRESS NOTES
CT scan of the abdomen pelvis performed tonight showed perforated bowel with free air under the diaphragm and free fluid and abscess. I discussed with the  patient after she came down to the surgical suite. Plan was to proceed with an exploratory laparotomy, bowel resection possible colostomy. I did explain this in detail to the patient. But the patient did not want to undergo any major surgery or put through ventilator. All she requested was, she wanted to be comfortable and put to sleep without much pain. I offered the option of hospice/comfort care. I discussed this with her son as well. Patient discussed with her son as well. The son had to agree with the patient/his mom and wanted to proceed as per her wish. Patient at the time of the decision making was awake alert and oriented. I did speak again with the son who is the POA and he agreed and wanted to proceed with hospice and comfort care. My nurse RN Darron Baez was present during the entire conversation. The CRNA Ms. Jud Mckeon was also present during the conversation as well. I conveyed this to the hospitalist Dr. Salina King. Patient was transferred to room. We will continue with IV fluids n.p.o. pain medication and antibiotics. Hospice consult will be placed early in a.m.     Thank you

## 2022-05-23 NOTE — ROUTINE PROCESS
Radiologist [facility] just called and was trying to get Dr. Corinna Lam I told him Dr. Fátima Lin was the one that ordered the Ct. TJ said it was getting worse and that he would contact Dr. Fátima Lin or call me back.

## 2022-05-23 NOTE — DISCHARGE INSTRUCTIONS
INSTRUCTIONS ON DISCHARGE    (1) please take the antibiotic as follows:              AUGMENTIN 500 mg twice a day for 7 days              FLUCONAZOLE 100mg daily for 10 days    (2) comfort care set up

## 2022-05-23 NOTE — TELEPHONE ENCOUNTER
Called Trish back and the family has declined Urology care and will manage hart through comfort acre and hospice

## 2022-05-23 NOTE — TELEPHONE ENCOUNTER
Per the discharge summary; the patient elected for comfort care and hospice. We do not typically remove a hart in that situation. I would speak directly with a family member to see if they want to be seen/establish care with Urology.     If so- can see either physician in ~1 month for hart exchange or voiding trial.

## 2022-05-23 NOTE — TELEPHONE ENCOUNTER
Trish from [facility] needed to make a 1-2 week follow up appt with Dr. Mg Wood for urinary  Retention. Cordero cath placed .  She said you can call her back with the appt date and time if it is before 2:00

## 2022-05-23 NOTE — ROUTINE PROCESS
Family is in room with patient now and will be taking her ring home that was on her right hand.  She has decided on hospice care and will discuss this with  Doctor in am.

## 2022-05-23 NOTE — PROGRESS NOTES
CM discussed discharge planning with patient and family. Patient will return to [facility] [address]. Family request to use Hospice provider used by Facility. DONTRELL spoke with Zack Lombardi at [facility]. She recommended [facility]. CM informed ann marie Prieto who agreed. Referral sent. DONTRELL spoke with Negrita Shaffer [phone] with At [facility]. They will reach out to son Calixto Prieto and [facility] to began referral.     Patient accepted by [facility]. She will transport back to [facility] this evening. She will go to Rm 2 on [facility]. Nurse to call report to [phone]. Primary and discharge nurses aware. Patient will require stretcher transport. Medicare pt has received, reviewed, and signed 2nd IM letter informing them of their right to appeal the discharge. Signed copied has been placed on pt bedside chart.

## 2022-05-23 NOTE — PROGRESS NOTES
Dr. Rocky Scheuermann just called me from his conversation with Donell Fair radiologist that called him with the ct results tonight at [phone] pm. Dr. Rocky Scheuermann asked me to talk to patient and see if she is agreeable to have this surgery. She said she was and I gave Dr. Rocky Scheuermann the dauther's number and name to call for consent . I do not think the patient fully understands all that the surgeon is going to do.

## 2022-05-23 NOTE — DISCHARGE SUMMARY
Physician Discharge Summary     Patient ID:    Jarad Pratt  [id number]  71 y.o.  3/20/1925    Admit date: 5/18/2022    Discharge date : 5/23/2022    Chronic Diagnoses:    Problem List as of 5/23/2022 Date Reviewed: 9/2/2012          Codes Class Noted - Resolved    Moderate protein-energy malnutrition ([facility] [address] ICD-10-CM: E44.0  ICD-9-CM: 263.0  5/19/2022 - Present        Diverticulitis of large intestine with perforation ICD-10-CM: K57.20  ICD-9-CM: 562.11  5/18/2022 - Present        Fracture of right humerus ICD-10-CM: S42.301A  ICD-9-CM: 812.20  9/1/2012 - Present          22    Final Diagnoses:   Diverticulitis of large intestine with perforation [K57.20]    Reason for Hospitalization:  (1) Sigmoid diverticulitis with massive perforation :     (2) ileus     (2) HTN: continue amlodipine     (3) hypothyroidism: levothyroxine     (4) ileus: npo, IVF, dilaudid for pain. Hospital Course:   89F< with sigmoid diverticulitis with microperforation.     HOSPITAL COURSE:   89F with past medical history for arthritis, hypertension, hypothyroidism, recent falls, lives in a womens home because of physical debility. 5/18/22 presents to hospital because of abdominal pains. Development over the previous one month. Primarily in the left lower quadrant. Crampy and intermittent. Worsens with movement. Associated with nausea. During the ED course CT imaging suggestive of sigmoid diverticulitis with microperforation. Managed with fluids and broad spectrum antibiotics . Abdominal pain better today, will allow clear liquid diet today and plan for dc tomorrow is tolerated, however on 5/21 after lunch she developed severe abdominal pain CXR showed ileus. She was kept NPO, IVF and code changed to DNR DNI after discussion with her and daughter     CT scan of the abdomen pelvis performed tonight showed perforated bowel with free air under the diaphragm and free fluid and abscess.   I discussed with the  patient after she came down to the surgical suite. Plan was to proceed with an exploratory laparotomy, bowel resection possible colostomy. I did explain this in detail to the patient. But the patient did not want to undergo any major surgery or put through ventilator. All she requested was, she wanted to be comfortable and put to sleep without much pain. I offered the option of hospice/comfort care. I discussed this with her son as well. Patient discussed with her son as well. The son had to agree with the patient/his mom and wanted to proceed as per her wish. Patient at the time of the decision making was awake alert and oriented. I did speak again with the son who is the POA and he agreed and wanted to proceed with hospice and comfort care. My nurse RN Gregg Andino was present during the entire conversation. The CRNA Ms. Nadiya Montes was also present during the conversation as well. I conveyed this to the hospitalist Dr. Eliza Baltazar. Patient was transferred to room. We will continue with IV fluids n.p.o. pain medication and antibiotics. Discussed with son HOLLIS and patient,  Agrees to place in comfort care orders/       INSTRUCTIONS ON DISCHARGE    (1) please take the antibiotic as follows:              AUGMENTIN 500 mg twice a day for 7 days              FLUCONAZOLE 100mg daily for 10 days    (2) comfort care set up        Discharge Medications:   Current Discharge Medication List      START taking these medications    Details   fluconazole (DIFLUCAN) 200 mg tablet Take 1 Tablet by mouth daily for 10 days. FDA advises cautious prescribing of oral fluconazole in pregnancy. Qty: 10 Tablet, Refills: 0  Start date: 5/23/2022, End date: 6/2/2022      amoxicillin-clavulanate (Augmentin) 500-125 mg per tablet Take 1 Tablet by mouth two (2) times a day for 7 days. Qty: 14 Tablet, Refills: 0  Start date: 5/21/2022, End date: 5/28/2022      famotidine (PEPCID) 10 mg tablet Take 1 Tablet by mouth daily as needed for Heartburn.   Qty: 7 Tablet, Refills: 0  Start date: 5/21/2022      Saccharomyces boulardii 250 mg pwpk Take 1 Packet by mouth daily. Qty: 7 Packet, Refills: 0  Start date: 5/21/2022         CONTINUE these medications which have NOT CHANGED    Details   cholestyramine (QUESTRAN) 4 gram packet Take 1 Packet by mouth every eight (8) hours as needed for Diarrhea. cyanocobalamin (VITAMIN B12) 500 mcg tablet Take 500 mcg by mouth daily. losartan (COZAAR) 100 mg tablet Take 100 mg by mouth daily. acetaminophen (TYLENOL) 500 mg tablet Take 1,000 mg by mouth three (3) times daily as needed for Pain. ferrous sulfate (IRON) 325 mg (65 mg iron) EC tablet Take 325 mg by mouth daily. fluocinoNIDE (LIDEX) 0.05 % external solution Apply  to affected area every six (6) hours. Apply to scalp every 6 hours as needed for itching      ondansetron hcl (ZOFRAN) 4 mg tablet Take 4 mg by mouth every six (6) hours as needed for Nausea or Vomiting. polyethylene glycol (MIRALAX) 17 gram packet Take 17 g by mouth daily as needed for Constipation. triamcinolone acetonide (KENALOG) 0.1 % topical cream Apply  to affected area two (2) times daily as needed for Itching. Apply to rash twice daily as needed for itching      diclofenac (VOLTAREN) 1 % gel Apply 1 g to affected area three (3) times daily as needed for Pain. Apply to each shoulder and each hand      cholecalciferol (Vitamin D3) (1000 Units /25 mcg) tablet Take 1,000 Units by mouth daily. coenzyme q10 (Co Q-10) 10 mg cap Take 1 Tablet by mouth daily. aspirin delayed-release 81 mg tablet Take 1 Tablet by mouth two (2) times a day. Qty: 60 Tablet, Refills: 0      simvastatin (ZOCOR) 20 mg tablet Take 20 mg by mouth daily. levothyroxine (Synthroid) 75 mcg tablet Take 75 mcg by mouth Daily (before breakfast). STOP taking these medications       amLODIPine (NORVASC) 5 mg tablet Comments:   Reason for Stopping: Follow up Care:    1.  Piter He Lai Sigala MD in 1-2 weeks. Please call to set up an appointment shortly after discharge. Diet:  NPO, with sips    Disposition:  Home hospice    Advanced Directive:   FULL    DNR x     Discharge Exam:  General: well appearing, no acute distress  Head: Normal  Face: Nornal  HEENT: atraumatic, PERRLA, moist mucosa, normal pharynx, normal tonsils and adenoids, normal tongue, no fluid in sinuses  Neck: Trachea midline, no carotid bruit, no masses  Chest: Normal.  Respiratory: normal chest wall expansion, CTA B, no r/r/w, no rubs  Cardiovascular: RRR, no m/r/g, Normal S1 and S2  Abdomen: Soft, suprapubic & bilateral lower quadrant tenderness,  Mildly distended, normal bowel sounds in all quadrants, no hepatosplenomegaly, no tympany. Incision scar: none  Genitourinary: No inguinal hernia, has hart, normal external gentalia, no renal angle tenderness  Rectal: deferred  Musculoskeletal: Normal ROM in upper and lower extremities, No joint swelling. Integumentary: Warm, dry, and pink, with no rash, purpura, or petechia  Heme/Lymph: No lymphadenopathy, no bruises  Neurological: Cranial Nerves II-XII grossly intact, No gross sensory or motor deficit. Psychiatric: Cooperative with normal mood, affect, and cognition       CONSULTATIONS:surgery  Significant Diagnostic Studies:     Radiologic Studies -   Results from Hospital Encounter encounter on 05/18/22    XR ABD ACUTE W 1 V CHEST    Narrative  Exam: Acute abdomen series    Comparison: 5/20/2022. 5/19/2022. 7/7/2020. Number of views: 3    Findings:  Chest: Expiratory radiograph. Left base atelectasis and less likely pneumonia,  infarct or other. Right lung clear. Negative for pleural disease. Cardiac,  mediastinal, and hilar shadows unremarkable. .  Abdomen: Diffuse nonspecific ileus pattern, and unlikely distal obstruction. Bowel gas pattern otherwise unremarkable.  There is no bowel wall thickening,  pneumatosis intestinalis, portal venous gas, or free subdiaphragmatic air. Negative for mass effect. .    Impression  1. Left base atelectasis and less likely pneumonia, infarct or other. 2. Diffuse ileus and unlikely mechanical obstruction. CT Results  (Last 48 hours)               05/22/22 2303  CT ABD PELV W WO CONT Final result    Impression:  Marquis Valdez evidence of perforation with new bubbles of free air   throughout the nondependent abdomen as well as numerous air and fluid   collections throughout the left abdomen and pelvis and presumably associated   with perforated diverticulitis. I suspect there is degree of reactive change in   the small bowel possibly from peritonitis and a persistent degree of ileus. Increased atelectasis or devon airspace disease at the right lung base; consider   developing pneumonia or sequela of aspiration. .. Results called to Dr. Lisa Hidalgo the attending surgeon on 5/22/2022 11:50 PM.                       Narrative:  HISTORY:  ileus   Dose reduction technique: All CT scans at this facility are performed using dose reduction optimization   technique as appropriate on the exam including the following: Automated exposure   control, adjustment of the MA and/or KV according to patient size and/or use of   iterative reconstructive technique. TECHNIQUE:  CT ABD PELV W WO CONT                            100 cc Isovue-370 injected. COMPARISON: Pelvic CT performed 2 days ago, abdomen and pelvis CT performed 3   days ago. LIMITATIONS: None       CHEST: Increased atelectasis or devon airspace disease now present at the right   lung base with small right pleural effusion. Multichamber cardiomegaly partially   visualized.        LIVER: Normal   GALLBLADDER: Distended   BILIARY TREE: Normal   PANCREAS: Normal   SPLEEN: Normal   ADRENAL GLANDS: Normal   KIDNEYS/URETERS/BLADDER: Normal   AORTA/RETROPERITONEUM: Normal   BOWEL/MESENTERY: Sigmoid diverticulitis again noted with air and fluid   collection along the posterior border of the sigmoid colon axial image 117   series 301 for example. There is diffuse wall thickening of the small bowel   throughout the lower abdomen and pelvis and diffuse surrounding mesenteric   stranding/fluid with air and fluid collections in the left abdomen and pelvis as   described below. Dilated loops of more proximal small bowel are also noted. APPENDIX: Identified and normal   PERITONEAL CAVITY: Compared to the prior examination there is increasing pockets   of air and fluid throughout the pelvis and the lower abdomen on the left for   instance with a air and fluid collection seen on axial images  in the   anterior left abdomen. . Diffuse free air throughout the nondependent upper   abdomen is new since the exam performed 3 days ago   REPRODUCTIVE ORGANS: Unchanged, posterior calcified involuted uterine fibroid   suggested. BONE/TISSUES: No acute abnormality.        OTHER: None                     Discharge time spent 35 minutes    Signed:  Hao Castle MD  5/23/2022  9:18 AM

## 2022-05-23 NOTE — ROUTINE PROCESS
Dr. Rajinder Ulloa surgical assistant just came and got Andrea Kapadia. I will try to call the daughter, Dr. Silvia Mcnally asked for her number earlier. They made her take off her ring and I put it in a round tub like is used for dentures.

## 2022-05-23 NOTE — PROGRESS NOTES
Discharge plan of care/case management plan validated with provider discharge order. Called report to Genevieve KARIMI at receiving facility.   Junie File  okay with patient returning with [facility]iley

## 2022-05-25 LAB
BACTERIA SPEC CULT: NORMAL
SPECIAL REQUESTS,SREQ: NORMAL